# Patient Record
Sex: FEMALE | Race: WHITE | Employment: OTHER | URBAN - NONMETROPOLITAN AREA
[De-identification: names, ages, dates, MRNs, and addresses within clinical notes are randomized per-mention and may not be internally consistent; named-entity substitution may affect disease eponyms.]

---

## 2017-01-30 ENCOUNTER — TELEPHONE (OUTPATIENT)
Dept: CARDIOLOGY | Age: 67
End: 2017-01-30

## 2017-02-06 ENCOUNTER — HOSPITAL ENCOUNTER (OUTPATIENT)
Dept: PREADMISSION TESTING | Age: 67
Discharge: HOME | End: 2017-02-06
Payer: MEDICARE

## 2017-02-06 ENCOUNTER — HOSPITAL ENCOUNTER (OUTPATIENT)
Dept: GENERAL RADIOLOGY | Age: 67
Discharge: HOME | End: 2017-02-06
Payer: MEDICARE

## 2017-02-06 VITALS — BODY MASS INDEX: 47.09 KG/M2 | HEIGHT: 66 IN | WEIGHT: 293 LBS

## 2017-02-06 LAB
ABO/RH: NORMAL
ANION GAP SERPL CALCULATED.3IONS-SCNC: 15 MMOL/L (ref 7–19)
ANTIBODY SCREEN: NORMAL
APTT: 28.1 SEC (ref 26–36.2)
BASOPHILS ABSOLUTE: 0 K/UL (ref 0–0.2)
BASOPHILS RELATIVE PERCENT: 0.5 % (ref 0–1)
BILIRUBIN URINE: NEGATIVE
BLOOD, URINE: NEGATIVE
BUN BLDV-MCNC: 12 MG/DL (ref 8–23)
CALCIUM SERPL-MCNC: 9.6 MG/DL (ref 8.8–10.2)
CHLORIDE BLD-SCNC: 102 MMOL/L (ref 98–111)
CLARITY: CLEAR
CO2: 26 MMOL/L (ref 22–29)
COLOR: YELLOW
CREAT SERPL-MCNC: 0.7 MG/DL (ref 0.5–0.9)
EOSINOPHILS ABSOLUTE: 0.2 K/UL (ref 0–0.6)
EOSINOPHILS RELATIVE PERCENT: 3 % (ref 0–5)
GFR NON-AFRICAN AMERICAN: >60
GLUCOSE BLD-MCNC: 121 MG/DL (ref 74–109)
GLUCOSE URINE: NEGATIVE MG/DL
HCT VFR BLD CALC: 42.9 % (ref 37–47)
HEMOGLOBIN: 14.5 G/DL (ref 12–16)
INR BLD: 0.99 (ref 0.88–1.18)
KETONES, URINE: NEGATIVE MG/DL
LEUKOCYTE ESTERASE, URINE: NEGATIVE
LYMPHOCYTES ABSOLUTE: 2.3 K/UL (ref 1.1–4.5)
LYMPHOCYTES RELATIVE PERCENT: 36.4 % (ref 20–40)
MCH RBC QN AUTO: 30.5 PG (ref 27–31)
MCHC RBC AUTO-ENTMCNC: 33.8 G/DL (ref 33–37)
MCV RBC AUTO: 90.1 FL (ref 81–99)
MONOCYTES ABSOLUTE: 0.5 K/UL (ref 0–0.9)
MONOCYTES RELATIVE PERCENT: 7.5 % (ref 0–10)
NEUTROPHILS ABSOLUTE: 3.4 K/UL (ref 1.5–7.5)
NEUTROPHILS RELATIVE PERCENT: 52.6 % (ref 50–65)
NITRITE, URINE: NEGATIVE
PDW BLD-RTO: 12.8 % (ref 11.5–14.5)
PH UA: 6
PLATELET # BLD: 250 K/UL (ref 130–400)
PMV BLD AUTO: 10.2 FL (ref 7.4–10.4)
POTASSIUM SERPL-SCNC: 4.2 MMOL/L (ref 3.5–5)
PROTEIN UA: NEGATIVE MG/DL
PROTHROMBIN TIME: 13.1 SEC (ref 12–14.6)
RBC # BLD: 4.76 M/UL (ref 4.2–5.4)
SODIUM BLD-SCNC: 143 MMOL/L (ref 136–145)
SPECIFIC GRAVITY UA: 1.01
UROBILINOGEN, URINE: 0.2 E.U./DL
WBC # BLD: 6.4 K/UL (ref 4.8–10.8)

## 2017-02-06 PROCEDURE — 86900 BLOOD TYPING SEROLOGIC ABO: CPT

## 2017-02-06 PROCEDURE — 93005 ELECTROCARDIOGRAM TRACING: CPT

## 2017-02-06 PROCEDURE — 86901 BLOOD TYPING SEROLOGIC RH(D): CPT

## 2017-02-06 PROCEDURE — 87070 CULTURE OTHR SPECIMN AEROBIC: CPT

## 2017-02-06 PROCEDURE — 85730 THROMBOPLASTIN TIME PARTIAL: CPT

## 2017-02-06 PROCEDURE — 86850 RBC ANTIBODY SCREEN: CPT

## 2017-02-06 PROCEDURE — 85610 PROTHROMBIN TIME: CPT

## 2017-02-06 PROCEDURE — 71020 XR CHEST STANDARD TWO VW: CPT

## 2017-02-06 PROCEDURE — 85025 COMPLETE CBC W/AUTO DIFF WBC: CPT

## 2017-02-06 PROCEDURE — 81003 URINALYSIS AUTO W/O SCOPE: CPT

## 2017-02-06 PROCEDURE — 80048 BASIC METABOLIC PNL TOTAL CA: CPT

## 2017-02-06 RX ORDER — ACETAMINOPHEN,DIPHENHYDRAMINE HCL 500; 25 MG/1; MG/1
2 TABLET, FILM COATED ORAL NIGHTLY PRN
COMMUNITY
End: 2022-03-16

## 2017-02-06 RX ORDER — OXYCODONE AND ACETAMINOPHEN 7.5; 325 MG/1; MG/1
1 TABLET ORAL EVERY 4 HOURS PRN
COMMUNITY
End: 2017-07-24 | Stop reason: ALTCHOICE

## 2017-02-06 RX ORDER — PHENOL 1.4 %
AEROSOL, SPRAY (ML) MUCOUS MEMBRANE DAILY
COMMUNITY

## 2017-02-07 ENCOUNTER — ANESTHESIA EVENT (OUTPATIENT)
Dept: OPERATING ROOM | Age: 67
End: 2017-02-07
Payer: MEDICARE

## 2017-02-07 ENCOUNTER — APPOINTMENT (OUTPATIENT)
Dept: GENERAL RADIOLOGY | Age: 67
End: 2017-02-07
Attending: ORTHOPAEDIC SURGERY
Payer: MEDICARE

## 2017-02-07 ENCOUNTER — ANESTHESIA (OUTPATIENT)
Dept: OPERATING ROOM | Age: 67
End: 2017-02-07
Payer: MEDICARE

## 2017-02-07 ENCOUNTER — HOSPITAL ENCOUNTER (OUTPATIENT)
Age: 67
Setting detail: OBSERVATION
Discharge: HOME | End: 2017-02-07
Attending: ORTHOPAEDIC SURGERY | Admitting: ORTHOPAEDIC SURGERY
Payer: MEDICARE

## 2017-02-07 VITALS — DIASTOLIC BLOOD PRESSURE: 53 MMHG | OXYGEN SATURATION: 97 % | SYSTOLIC BLOOD PRESSURE: 121 MMHG

## 2017-02-07 VITALS
TEMPERATURE: 97.2 F | OXYGEN SATURATION: 93 % | RESPIRATION RATE: 16 BRPM | SYSTOLIC BLOOD PRESSURE: 131 MMHG | HEIGHT: 66 IN | DIASTOLIC BLOOD PRESSURE: 78 MMHG | HEART RATE: 70 BPM | BODY MASS INDEX: 47.09 KG/M2 | WEIGHT: 293 LBS

## 2017-02-07 DIAGNOSIS — M17.12 OSTEOARTHROSIS, LOCALIZED, PRIMARY, KNEE, LEFT: Primary | ICD-10-CM

## 2017-02-07 LAB
EKG P AXIS: 44 DEGREES
EKG P-R INTERVAL: 174 MS
EKG Q-T INTERVAL: 420 MS
EKG QRS DURATION: 132 MS
EKG QTC CALCULATION (BAZETT): 458 MS
EKG T AXIS: 41 DEGREES
MRSA CULTURE ONLY: NORMAL

## 2017-02-07 PROCEDURE — 7100000001 HC PACU RECOVERY - ADDTL 15 MIN: Performed by: ORTHOPAEDIC SURGERY

## 2017-02-07 PROCEDURE — 7100000010 HC PHASE II RECOVERY - FIRST 15 MIN: Performed by: ORTHOPAEDIC SURGERY

## 2017-02-07 PROCEDURE — G8980 MOBILITY D/C STATUS: HCPCS

## 2017-02-07 PROCEDURE — 64520 N BLOCK LUMBAR/THORACIC: CPT | Performed by: NURSE ANESTHETIST, CERTIFIED REGISTERED

## 2017-02-07 PROCEDURE — 2720000001 HC MISC SURG SUPPLY STERILE $51-500: Performed by: ORTHOPAEDIC SURGERY

## 2017-02-07 PROCEDURE — G0378 HOSPITAL OBSERVATION PER HR: HCPCS

## 2017-02-07 PROCEDURE — 6360000002 HC RX W HCPCS: Performed by: ORTHOPAEDIC SURGERY

## 2017-02-07 PROCEDURE — 2580000003 HC RX 258: Performed by: ORTHOPAEDIC SURGERY

## 2017-02-07 PROCEDURE — 3700000000 HC ANESTHESIA ATTENDED CARE: Performed by: ORTHOPAEDIC SURGERY

## 2017-02-07 PROCEDURE — 3209999900 FLUORO FOR SURGICAL PROCEDURES

## 2017-02-07 PROCEDURE — 6360000002 HC RX W HCPCS: Performed by: NURSE ANESTHETIST, CERTIFIED REGISTERED

## 2017-02-07 PROCEDURE — 97162 PT EVAL MOD COMPLEX 30 MIN: CPT

## 2017-02-07 PROCEDURE — 6370000000 HC RX 637 (ALT 250 FOR IP): Performed by: ORTHOPAEDIC SURGERY

## 2017-02-07 PROCEDURE — G8979 MOBILITY GOAL STATUS: HCPCS

## 2017-02-07 PROCEDURE — 7100000000 HC PACU RECOVERY - FIRST 15 MIN: Performed by: ORTHOPAEDIC SURGERY

## 2017-02-07 PROCEDURE — C1776 JOINT DEVICE (IMPLANTABLE): HCPCS | Performed by: ORTHOPAEDIC SURGERY

## 2017-02-07 PROCEDURE — 3600000015 HC SURGERY LEVEL 5 ADDTL 15MIN: Performed by: ORTHOPAEDIC SURGERY

## 2017-02-07 PROCEDURE — 2500000003 HC RX 250 WO HCPCS: Performed by: ORTHOPAEDIC SURGERY

## 2017-02-07 PROCEDURE — 2500000003 HC RX 250 WO HCPCS: Performed by: NURSE ANESTHETIST, CERTIFIED REGISTERED

## 2017-02-07 PROCEDURE — 73560 X-RAY EXAM OF KNEE 1 OR 2: CPT

## 2017-02-07 PROCEDURE — 2720000003 HC MISC SUTURE/STAPLES/RELOADS/ETC: Performed by: ORTHOPAEDIC SURGERY

## 2017-02-07 PROCEDURE — 3700000001 HC ADD 15 MINUTES (ANESTHESIA): Performed by: ORTHOPAEDIC SURGERY

## 2017-02-07 PROCEDURE — C1713 ANCHOR/SCREW BN/BN,TIS/BN: HCPCS | Performed by: ORTHOPAEDIC SURGERY

## 2017-02-07 PROCEDURE — 7100000011 HC PHASE II RECOVERY - ADDTL 15 MIN: Performed by: ORTHOPAEDIC SURGERY

## 2017-02-07 PROCEDURE — 36415 COLL VENOUS BLD VENIPUNCTURE: CPT

## 2017-02-07 PROCEDURE — G8978 MOBILITY CURRENT STATUS: HCPCS

## 2017-02-07 PROCEDURE — 3600000005 HC SURGERY LEVEL 5 BASE: Performed by: ORTHOPAEDIC SURGERY

## 2017-02-07 DEVICE — DISCONTINUED USE 416978 CEMENT PALACOS R SING DOSE 40GR: Type: IMPLANTABLE DEVICE | Site: KNEE | Status: FUNCTIONAL

## 2017-02-07 DEVICE — COMPONENT FEM M PART KNEE TWIN PEGGED UNI OXFORD: Type: IMPLANTABLE DEVICE | Site: FEMUR | Status: FUNCTIONAL

## 2017-02-07 RX ORDER — MORPHINE SULFATE 4 MG/ML
2 INJECTION, SOLUTION INTRAMUSCULAR; INTRAVENOUS EVERY 5 MIN PRN
Status: DISCONTINUED | OUTPATIENT
Start: 2017-02-07 | End: 2017-02-07 | Stop reason: HOSPADM

## 2017-02-07 RX ORDER — AMITRIPTYLINE HYDROCHLORIDE 25 MG/1
25 TABLET, FILM COATED ORAL NIGHTLY
Status: CANCELLED | OUTPATIENT
Start: 2017-02-07

## 2017-02-07 RX ORDER — APREPITANT 40 MG/1
40 CAPSULE ORAL ONCE
Status: DISCONTINUED | OUTPATIENT
Start: 2017-02-07 | End: 2017-02-07 | Stop reason: HOSPADM

## 2017-02-07 RX ORDER — DIAZEPAM 5 MG/1
5 TABLET ORAL EVERY 6 HOURS PRN
Status: CANCELLED | OUTPATIENT
Start: 2017-02-07

## 2017-02-07 RX ORDER — LABETALOL HYDROCHLORIDE 5 MG/ML
5 INJECTION, SOLUTION INTRAVENOUS EVERY 10 MIN PRN
Status: DISCONTINUED | OUTPATIENT
Start: 2017-02-07 | End: 2017-02-07 | Stop reason: HOSPADM

## 2017-02-07 RX ORDER — SIMVASTATIN 5 MG
5 TABLET ORAL NIGHTLY
Status: CANCELLED | OUTPATIENT
Start: 2017-02-07

## 2017-02-07 RX ORDER — 0.9 % SODIUM CHLORIDE 0.9 %
500 INTRAVENOUS SOLUTION INTRAVENOUS PRN
Status: CANCELLED | OUTPATIENT
Start: 2017-02-07

## 2017-02-07 RX ORDER — TRAMADOL HYDROCHLORIDE 50 MG/1
50 TABLET ORAL EVERY 6 HOURS PRN
Status: CANCELLED | OUTPATIENT
Start: 2017-02-07

## 2017-02-07 RX ORDER — OXYCODONE HCL 10 MG/1
10 TABLET, FILM COATED, EXTENDED RELEASE ORAL ONCE
Status: COMPLETED | OUTPATIENT
Start: 2017-02-07 | End: 2017-02-07

## 2017-02-07 RX ORDER — SODIUM CHLORIDE 0.9 % (FLUSH) 0.9 %
10 SYRINGE (ML) INJECTION PRN
Status: CANCELLED | OUTPATIENT
Start: 2017-02-07

## 2017-02-07 RX ORDER — DOCUSATE SODIUM 100 MG/1
100 CAPSULE, LIQUID FILLED ORAL DAILY
Qty: 20 CAPSULE | Refills: 0 | Status: SHIPPED | OUTPATIENT
Start: 2017-02-07 | End: 2017-07-24 | Stop reason: ALTCHOICE

## 2017-02-07 RX ORDER — PROMETHAZINE HYDROCHLORIDE 25 MG/ML
6.25 INJECTION, SOLUTION INTRAMUSCULAR; INTRAVENOUS
Status: DISCONTINUED | OUTPATIENT
Start: 2017-02-07 | End: 2017-02-07 | Stop reason: HOSPADM

## 2017-02-07 RX ORDER — BUPIVACAINE HYDROCHLORIDE 7.5 MG/ML
INJECTION, SOLUTION INTRASPINAL PRN
Status: DISCONTINUED | OUTPATIENT
Start: 2017-02-07 | End: 2017-02-07 | Stop reason: SDUPTHER

## 2017-02-07 RX ORDER — MORPHINE SULFATE 4 MG/ML
2 INJECTION, SOLUTION INTRAMUSCULAR; INTRAVENOUS
Status: CANCELLED | OUTPATIENT
Start: 2017-02-07

## 2017-02-07 RX ORDER — TRANEXAMIC ACID 650 1/1
1950 TABLET ORAL ONCE
Status: COMPLETED | OUTPATIENT
Start: 2017-02-07 | End: 2017-02-07

## 2017-02-07 RX ORDER — SODIUM CHLORIDE 0.9 % (FLUSH) 0.9 %
10 SYRINGE (ML) INJECTION EVERY 12 HOURS SCHEDULED
Status: CANCELLED | OUTPATIENT
Start: 2017-02-07

## 2017-02-07 RX ORDER — DIPHENHYDRAMINE HYDROCHLORIDE 50 MG/ML
12.5 INJECTION INTRAMUSCULAR; INTRAVENOUS
Status: DISCONTINUED | OUTPATIENT
Start: 2017-02-07 | End: 2017-02-07 | Stop reason: HOSPADM

## 2017-02-07 RX ORDER — METOCLOPRAMIDE HYDROCHLORIDE 5 MG/ML
10 INJECTION INTRAMUSCULAR; INTRAVENOUS
Status: DISCONTINUED | OUTPATIENT
Start: 2017-02-07 | End: 2017-02-07 | Stop reason: HOSPADM

## 2017-02-07 RX ORDER — SODIUM CHLORIDE, SODIUM LACTATE, POTASSIUM CHLORIDE, CALCIUM CHLORIDE 600; 310; 30; 20 MG/100ML; MG/100ML; MG/100ML; MG/100ML
INJECTION, SOLUTION INTRAVENOUS ONCE
Status: COMPLETED | OUTPATIENT
Start: 2017-02-07 | End: 2017-02-07

## 2017-02-07 RX ORDER — DIPHENHYDRAMINE HCL 25 MG
25 CAPSULE ORAL EVERY 6 HOURS PRN
Status: CANCELLED | OUTPATIENT
Start: 2017-02-07

## 2017-02-07 RX ORDER — OXYCODONE HYDROCHLORIDE 5 MG/1
10 TABLET ORAL ONCE
Status: COMPLETED | OUTPATIENT
Start: 2017-02-07 | End: 2017-02-07

## 2017-02-07 RX ORDER — POTASSIUM CHLORIDE 20 MEQ/1
20 TABLET, EXTENDED RELEASE ORAL 2 TIMES DAILY
Status: CANCELLED | OUTPATIENT
Start: 2017-02-07

## 2017-02-07 RX ORDER — ACETAMINOPHEN 500 MG
1000 TABLET ORAL ONCE
Status: COMPLETED | OUTPATIENT
Start: 2017-02-07 | End: 2017-02-07

## 2017-02-07 RX ORDER — DEXAMETHASONE SODIUM PHOSPHATE 10 MG/ML
INJECTION INTRAMUSCULAR; INTRAVENOUS PRN
Status: DISCONTINUED | OUTPATIENT
Start: 2017-02-07 | End: 2017-02-07 | Stop reason: SDUPTHER

## 2017-02-07 RX ORDER — EPHEDRINE SULFATE 50 MG/ML
INJECTION, SOLUTION INTRAVENOUS PRN
Status: DISCONTINUED | OUTPATIENT
Start: 2017-02-07 | End: 2017-02-07 | Stop reason: SDUPTHER

## 2017-02-07 RX ORDER — ASPIRIN 81 MG/1
81 TABLET ORAL 2 TIMES DAILY
Status: CANCELLED | OUTPATIENT
Start: 2017-02-07

## 2017-02-07 RX ORDER — GABAPENTIN 300 MG/1
300 CAPSULE ORAL 2 TIMES DAILY
Status: CANCELLED | OUTPATIENT
Start: 2017-02-07

## 2017-02-07 RX ORDER — OXYCODONE HYDROCHLORIDE 5 MG/1
10 TABLET ORAL EVERY 4 HOURS PRN
Status: CANCELLED | OUTPATIENT
Start: 2017-02-07

## 2017-02-07 RX ORDER — MEPERIDINE HYDROCHLORIDE 25 MG/ML
12.5 INJECTION INTRAMUSCULAR; INTRAVENOUS; SUBCUTANEOUS EVERY 5 MIN PRN
Status: DISCONTINUED | OUTPATIENT
Start: 2017-02-07 | End: 2017-02-07 | Stop reason: HOSPADM

## 2017-02-07 RX ORDER — OXYCODONE HYDROCHLORIDE 10 MG/1
TABLET ORAL
Qty: 40 TABLET | Refills: 0 | Status: SHIPPED | OUTPATIENT
Start: 2017-02-07 | End: 2017-07-24 | Stop reason: ALTCHOICE

## 2017-02-07 RX ORDER — ASPIRIN 81 MG/1
81 TABLET ORAL 2 TIMES DAILY
Qty: 60 TABLET | Refills: 0 | Status: SHIPPED | OUTPATIENT
Start: 2017-02-07 | End: 2017-07-24 | Stop reason: CLARIF

## 2017-02-07 RX ORDER — PROPOFOL 10 MG/ML
INJECTION, EMULSION INTRAVENOUS CONTINUOUS PRN
Status: DISCONTINUED | OUTPATIENT
Start: 2017-02-07 | End: 2017-02-07 | Stop reason: SDUPTHER

## 2017-02-07 RX ORDER — DIAZEPAM 5 MG/1
5 TABLET ORAL EVERY 6 HOURS PRN
Qty: 40 TABLET | Refills: 0 | Status: SHIPPED | OUTPATIENT
Start: 2017-02-07 | End: 2017-02-17

## 2017-02-07 RX ORDER — ONDANSETRON 2 MG/ML
INJECTION INTRAMUSCULAR; INTRAVENOUS PRN
Status: DISCONTINUED | OUTPATIENT
Start: 2017-02-07 | End: 2017-02-07 | Stop reason: SDUPTHER

## 2017-02-07 RX ORDER — MORPHINE SULFATE 4 MG/ML
4 INJECTION, SOLUTION INTRAMUSCULAR; INTRAVENOUS EVERY 5 MIN PRN
Status: DISCONTINUED | OUTPATIENT
Start: 2017-02-07 | End: 2017-02-07 | Stop reason: HOSPADM

## 2017-02-07 RX ORDER — BUMETANIDE 1 MG/1
1 TABLET ORAL EVERY MORNING
Status: CANCELLED | OUTPATIENT
Start: 2017-02-07

## 2017-02-07 RX ORDER — CYCLOBENZAPRINE HCL 10 MG
10 TABLET ORAL NIGHTLY
Status: CANCELLED | OUTPATIENT
Start: 2017-02-07

## 2017-02-07 RX ORDER — HYDRALAZINE HYDROCHLORIDE 20 MG/ML
5 INJECTION INTRAMUSCULAR; INTRAVENOUS EVERY 10 MIN PRN
Status: DISCONTINUED | OUTPATIENT
Start: 2017-02-07 | End: 2017-02-07 | Stop reason: HOSPADM

## 2017-02-07 RX ORDER — SODIUM CHLORIDE 9 MG/ML
INJECTION, SOLUTION INTRAVENOUS CONTINUOUS
Status: CANCELLED | OUTPATIENT
Start: 2017-02-07

## 2017-02-07 RX ORDER — SCOLOPAMINE TRANSDERMAL SYSTEM 1 MG/1
1 PATCH, EXTENDED RELEASE TRANSDERMAL ONCE
Status: DISCONTINUED | OUTPATIENT
Start: 2017-02-07 | End: 2017-02-07 | Stop reason: HOSPADM

## 2017-02-07 RX ORDER — OXYCODONE HYDROCHLORIDE 5 MG/1
20 TABLET ORAL EVERY 4 HOURS PRN
Status: CANCELLED | OUTPATIENT
Start: 2017-02-07

## 2017-02-07 RX ORDER — MECLIZINE HCL 12.5 MG/1
12.5 TABLET ORAL 3 TIMES DAILY PRN
Status: CANCELLED | OUTPATIENT
Start: 2017-02-07

## 2017-02-07 RX ORDER — MORPHINE SULFATE 4 MG/ML
4 INJECTION, SOLUTION INTRAMUSCULAR; INTRAVENOUS
Status: CANCELLED | OUTPATIENT
Start: 2017-02-07

## 2017-02-07 RX ORDER — DIAZEPAM 2 MG/1
2 TABLET ORAL EVERY 6 HOURS PRN
Status: CANCELLED | OUTPATIENT
Start: 2017-02-07

## 2017-02-07 RX ORDER — PROMETHAZINE HYDROCHLORIDE 25 MG/ML
6.25 INJECTION, SOLUTION INTRAMUSCULAR; INTRAVENOUS EVERY 6 HOURS PRN
Status: CANCELLED | OUTPATIENT
Start: 2017-02-07

## 2017-02-07 RX ORDER — ONDANSETRON 2 MG/ML
4 INJECTION INTRAMUSCULAR; INTRAVENOUS EVERY 6 HOURS PRN
Status: CANCELLED | OUTPATIENT
Start: 2017-02-07

## 2017-02-07 RX ORDER — DOCUSATE SODIUM 100 MG/1
100 CAPSULE, LIQUID FILLED ORAL 2 TIMES DAILY
Status: CANCELLED | OUTPATIENT
Start: 2017-02-07

## 2017-02-07 RX ORDER — ACETAMINOPHEN 325 MG/1
650 TABLET ORAL EVERY 4 HOURS PRN
Status: CANCELLED | OUTPATIENT
Start: 2017-02-07

## 2017-02-07 RX ADMIN — ONDANSETRON HYDROCHLORIDE 4 MG: 2 INJECTION, SOLUTION INTRAVENOUS at 10:00

## 2017-02-07 RX ADMIN — DEXAMETHASONE SODIUM PHOSPHATE 10 MG: 10 INJECTION INTRAMUSCULAR; INTRAVENOUS at 10:19

## 2017-02-07 RX ADMIN — BUPIVACAINE HYDROCHLORIDE IN DEXTROSE 2 ML: 7.5 INJECTION, SOLUTION SUBARACHNOID at 10:16

## 2017-02-07 RX ADMIN — TRANEXAMIC ACID 1950 MG: 650 TABLET ORAL at 09:10

## 2017-02-07 RX ADMIN — SODIUM CHLORIDE, POTASSIUM CHLORIDE, SODIUM LACTATE AND CALCIUM CHLORIDE: 600; 310; 30; 20 INJECTION, SOLUTION INTRAVENOUS at 09:10

## 2017-02-07 RX ADMIN — Medication 2 G: at 10:20

## 2017-02-07 RX ADMIN — ACETAMINOPHEN 1000 MG: 500 TABLET ORAL at 09:09

## 2017-02-07 RX ADMIN — OXYCODONE HYDROCHLORIDE 10 MG: 10 TABLET, FILM COATED, EXTENDED RELEASE ORAL at 09:09

## 2017-02-07 RX ADMIN — EPHEDRINE SULFATE 5 MG: 50 INJECTION, SOLUTION INTRAMUSCULAR; INTRAVENOUS; SUBCUTANEOUS at 10:49

## 2017-02-07 RX ADMIN — OXYCODONE HYDROCHLORIDE 10 MG: 5 TABLET ORAL at 15:26

## 2017-02-07 RX ADMIN — PROPOFOL 75 MCG/KG/MIN: 10 INJECTION, EMULSION INTRAVENOUS at 10:17

## 2017-02-07 ASSESSMENT — PAIN SCALES - GENERAL
PAINLEVEL_OUTOF10: 6
PAINLEVEL_OUTOF10: 2
PAINLEVEL_OUTOF10: 6

## 2017-02-07 ASSESSMENT — PAIN DESCRIPTION - PAIN TYPE
TYPE: SURGICAL PAIN
TYPE: SURGICAL PAIN

## 2017-02-07 ASSESSMENT — PAIN DESCRIPTION - LOCATION
LOCATION: KNEE
LOCATION: KNEE

## 2017-02-07 ASSESSMENT — PAIN DESCRIPTION - ORIENTATION
ORIENTATION: LEFT
ORIENTATION: LEFT

## 2017-07-24 ENCOUNTER — OFFICE VISIT (OUTPATIENT)
Dept: CARDIOLOGY | Age: 67
End: 2017-07-24
Payer: MEDICARE

## 2017-07-24 VITALS
SYSTOLIC BLOOD PRESSURE: 128 MMHG | WEIGHT: 272 LBS | HEIGHT: 66 IN | BODY MASS INDEX: 43.71 KG/M2 | DIASTOLIC BLOOD PRESSURE: 84 MMHG | HEART RATE: 75 BPM

## 2017-07-24 DIAGNOSIS — I15.9 SECONDARY HYPERTENSION: Primary | ICD-10-CM

## 2017-07-24 PROCEDURE — 93000 ELECTROCARDIOGRAM COMPLETE: CPT | Performed by: NURSE PRACTITIONER

## 2017-07-24 PROCEDURE — 99213 OFFICE O/P EST LOW 20 MIN: CPT | Performed by: NURSE PRACTITIONER

## 2017-07-24 PROCEDURE — 1036F TOBACCO NON-USER: CPT | Performed by: NURSE PRACTITIONER

## 2017-07-24 PROCEDURE — G8400 PT W/DXA NO RESULTS DOC: HCPCS | Performed by: NURSE PRACTITIONER

## 2017-07-24 PROCEDURE — 1090F PRES/ABSN URINE INCON ASSESS: CPT | Performed by: NURSE PRACTITIONER

## 2017-07-24 PROCEDURE — 3017F COLORECTAL CA SCREEN DOC REV: CPT | Performed by: NURSE PRACTITIONER

## 2017-07-24 PROCEDURE — 1123F ACP DISCUSS/DSCN MKR DOCD: CPT | Performed by: NURSE PRACTITIONER

## 2017-07-24 PROCEDURE — G8427 DOCREV CUR MEDS BY ELIG CLIN: HCPCS | Performed by: NURSE PRACTITIONER

## 2017-07-24 PROCEDURE — G8417 CALC BMI ABV UP PARAM F/U: HCPCS | Performed by: NURSE PRACTITIONER

## 2017-07-24 PROCEDURE — 4040F PNEUMOC VAC/ADMIN/RCVD: CPT | Performed by: NURSE PRACTITIONER

## 2017-07-24 PROCEDURE — 3014F SCREEN MAMMO DOC REV: CPT | Performed by: NURSE PRACTITIONER

## 2022-03-16 ENCOUNTER — OFFICE VISIT (OUTPATIENT)
Dept: NEUROLOGY | Age: 72
End: 2022-03-16
Payer: MEDICARE

## 2022-03-16 VITALS
DIASTOLIC BLOOD PRESSURE: 89 MMHG | SYSTOLIC BLOOD PRESSURE: 151 MMHG | WEIGHT: 290 LBS | HEART RATE: 90 BPM | BODY MASS INDEX: 46.61 KG/M2 | HEIGHT: 66 IN

## 2022-03-16 DIAGNOSIS — H53.149 PHOTOPHOBIA: ICD-10-CM

## 2022-03-16 DIAGNOSIS — G43.719 INTRACTABLE CHRONIC MIGRAINE WITHOUT AURA AND WITHOUT STATUS MIGRAINOSUS: Primary | ICD-10-CM

## 2022-03-16 DIAGNOSIS — R42 VERTIGO: ICD-10-CM

## 2022-03-16 DIAGNOSIS — H81.09 MENIERE'S DISEASE, UNSPECIFIED LATERALITY: ICD-10-CM

## 2022-03-16 PROCEDURE — G8400 PT W/DXA NO RESULTS DOC: HCPCS | Performed by: PSYCHIATRY & NEUROLOGY

## 2022-03-16 PROCEDURE — 1123F ACP DISCUSS/DSCN MKR DOCD: CPT | Performed by: PSYCHIATRY & NEUROLOGY

## 2022-03-16 PROCEDURE — 1036F TOBACCO NON-USER: CPT | Performed by: PSYCHIATRY & NEUROLOGY

## 2022-03-16 PROCEDURE — G8484 FLU IMMUNIZE NO ADMIN: HCPCS | Performed by: PSYCHIATRY & NEUROLOGY

## 2022-03-16 PROCEDURE — 1090F PRES/ABSN URINE INCON ASSESS: CPT | Performed by: PSYCHIATRY & NEUROLOGY

## 2022-03-16 PROCEDURE — G8427 DOCREV CUR MEDS BY ELIG CLIN: HCPCS | Performed by: PSYCHIATRY & NEUROLOGY

## 2022-03-16 PROCEDURE — 3017F COLORECTAL CA SCREEN DOC REV: CPT | Performed by: PSYCHIATRY & NEUROLOGY

## 2022-03-16 PROCEDURE — G8417 CALC BMI ABV UP PARAM F/U: HCPCS | Performed by: PSYCHIATRY & NEUROLOGY

## 2022-03-16 PROCEDURE — 4040F PNEUMOC VAC/ADMIN/RCVD: CPT | Performed by: PSYCHIATRY & NEUROLOGY

## 2022-03-16 PROCEDURE — 99204 OFFICE O/P NEW MOD 45 MIN: CPT | Performed by: PSYCHIATRY & NEUROLOGY

## 2022-03-16 RX ORDER — SUMATRIPTAN 100 MG/1
100 TABLET, FILM COATED ORAL
Qty: 9 TABLET | Refills: 5 | Status: SHIPPED | OUTPATIENT
Start: 2022-03-16 | End: 2022-03-16

## 2022-03-16 RX ORDER — TIZANIDINE 4 MG/1
4 TABLET ORAL EVERY 6 HOURS PRN
COMMUNITY

## 2022-03-16 RX ORDER — FUROSEMIDE 40 MG/1
TABLET ORAL
COMMUNITY
Start: 2022-01-31

## 2022-03-16 RX ORDER — FROVATRIPTAN SUCCINATE 2.5 MG/1
2.5 TABLET, FILM COATED ORAL PRN
Qty: 9 TABLET | Refills: 5 | Status: SHIPPED | OUTPATIENT
Start: 2022-03-16

## 2022-03-16 RX ORDER — RIZATRIPTAN BENZOATE 10 MG/1
10 TABLET ORAL
Qty: 9 TABLET | Refills: 5 | Status: SHIPPED | OUTPATIENT
Start: 2022-03-16 | End: 2022-03-16

## 2022-03-16 RX ORDER — ZOLMITRIPTAN 5 MG/1
5 TABLET, FILM COATED ORAL
Qty: 9 TABLET | Refills: 5 | Status: SHIPPED | OUTPATIENT
Start: 2022-03-16 | End: 2022-03-16

## 2022-03-16 RX ORDER — BUTALBITAL, ACETAMINOPHEN AND CAFFEINE 50; 325; 40 MG/1; MG/1; MG/1
1 TABLET ORAL EVERY 6 HOURS PRN
Qty: 10 TABLET | Refills: 0 | Status: SHIPPED | OUTPATIENT
Start: 2022-03-16

## 2022-03-16 RX ORDER — RIMEGEPANT SULFATE 75 MG/75MG
75 TABLET, ORALLY DISINTEGRATING ORAL PRN
Qty: 30 TABLET | Refills: 5 | Status: SHIPPED | OUTPATIENT
Start: 2022-03-16

## 2022-03-16 NOTE — PROGRESS NOTES
Chief Complaint   Patient presents with    New Patient     Referred by Dr. Kelly Perales for migraines, pt states she has been having a lot of bilateral arm pain as well         Feli Perez is a 80y.o. year old female who is seen for evaluation of migraines related to her Ménière's. The patient indicates that she has had Ménière's since 2014 and is treated at [facility]. She gets dexamethasone injections into her ear. Once or twice a month she will get a bad migraines associated with her Ménière's with associated photophobia, phonophobia and nausea. There is no visual change. She does have history of cervical fusion and does have some neck pain. There is no family history of migraines. Her previous MRIs have been unremarkable according to her. States typically she will take her Valium and meclizine and go to sleep. When she wakes up a few hours later her headache has resolved. Weather can trigger her migraines. Active Ambulatory Problems     Diagnosis Date Noted    Esophagitis 05/01/2010    HTN (hypertension) 05/01/2010    Tobacco abuse 05/01/2010    Obesity     DJD (degenerative joint disease)     IBS (irritable bowel syndrome)     Glucose intolerance (impaired glucose tolerance)     Intractable chronic migraine without aura and without status migrainosus 03/16/2022    Photophobia 03/16/2022    Meniere's disease 03/16/2022    Vertigo 03/16/2022     Resolved Ambulatory Problems     Diagnosis Date Noted    No Resolved Ambulatory Problems     Past Medical History:   Diagnosis Date    Acid reflux     Bundle branch block, left     Hyperlipidemia     PONV (postoperative nausea and vomiting)        Past Surgical History:   Procedure Laterality Date    APPENDECTOMY      CARDIAC CATHETERIZATION  05/30/2010    EF 60%.  normal coronay arteries     CERVICAL SPINE SURGERY  2011    ANTERIOR C5-6 FUSION    CERVICAL SPINE SURGERY      CHOLECYSTECTOMY      COLONOSCOPY      FOOT SURGERY      Left  HYSTERECTOMY      KNEE ARTHROPLASTY Left 2/7/2017    KNEE UNICOMPARTMENTAL ARTHROPLASTY  performed by Olaf Rowley MD at [address] UPPER GASTROINTESTINAL ENDOSCOPY  06/2010    DARYA biopsy. Family History   Problem Relation Age of Onset    Atrial Fibrillation Mother     Heart Disease Father 58        FATAL MI       Allergies   Allergen Reactions    Ace Inhibitors     Flagyl [Metronidazole]     Lyrica [Pregabalin]        Social History     Socioeconomic History    Marital status:      Spouse name: Not on file    Number of children: Not on file    Years of education: Not on file    Highest education level: Not on file   Occupational History    Not on file   Tobacco Use    Smoking status: Former Smoker     Types: Cigarettes     Quit date: 2/6/2007     Years since quitting: 15.1    Smokeless tobacco: Never Used    Tobacco comment: 06/01/2010:  Quit smoking in 2007. Substance and Sexual Activity    Alcohol use: No    Drug use: No    Sexual activity: Not on file   Other Topics Concern    Not on file   Social History Narrative    Not on file     Social Determinants of Health     Financial Resource Strain:     Difficulty of Paying Living Expenses: Not on file   Food Insecurity:     Worried About Running Out of Food in the Last Year: Not on file    [place] of Food in the Last Year: Not on file   Transportation Needs:     Lack of Transportation (Medical): Not on file    Lack of Transportation (Non-Medical):  Not on file   Physical Activity:     Days of Exercise per Week: Not on file    Minutes of Exercise per Session: Not on file   Stress:     Feeling of Stress : Not on file   Social Connections:     Frequency of Communication with Friends and Family: Not on file    Frequency of Social Gatherings with Friends and Family: Not on file    Attends Anabaptist Services: Not on file    Active Member of Clubs or Organizations: Not on file  Attends Club or Organization Meetings: Not on file    Marital Status: Not on file   Intimate Partner Violence:     Fear of Current or Ex-Partner: Not on file    Emotionally Abused: Not on file    Physically Abused: Not on file    Sexually Abused: Not on file   Housing Stability:     Unable to Pay for Housing in the Last Year: Not on file    Number of [place] in the Last Year: Not on file    Unstable Housing in the Last Year: Not on file       Review of Systems     Constitutional - No fever or chills. No diaphoresis or significant fatigue. HENT -  No tinnitus or significant hearing loss. Eyes - no sudden vision change or eye pain  Respiratory - no significant shortness of breath or cough  Cardiovascular - no chest pain No palpitations or significant leg swelling  Gastrointestinal - no abdominal swelling or pain. Genitourinary - No difficulty urinating, dysuria  Musculoskeletal - no back pain or myalgia. Skin - no color change or rash  Neurologic - No seizures. No lateralizing weakness. Hematologic - no easy bruising or excessive bleeding. Psychiatric - no severe anxiety or nervousness. All other review of systems are negative.       Current Outpatient Medications   Medication Sig Dispense Refill    furosemide (LASIX) 40 MG tablet TAKE 1 TABLET BY MOUTH EVERY DAY      tiZANidine (ZANAFLEX) 4 MG tablet Take 4 mg by mouth every 6 hours as needed      SUMAtriptan (IMITREX) 100 MG tablet Take 1 tablet by mouth once as needed for Migraine (max 2 in 24 hrs) 9 tablet 5    rizatriptan (MAXALT) 10 MG tablet Take 1 tablet by mouth once as needed for Migraine (max 2 in 24 hrs) May repeat in 2 hours if needed 9 tablet 5    ZOLMitriptan (ZOMIG) 5 MG tablet Take 1 tablet by mouth once as needed for Migraine 9 tablet 5    Ubrogepant 100 MG TABS Take 100 mg by mouth as needed (max 2 in 24 hours) 10 tablet 11    Rimegepant Sulfate (NURTEC) 75 MG TBDP Take 75 mg by mouth as needed (max one in 24 hours) 30 tablet 5    butalbital-acetaminophen-caffeine (ESGIC) -40 MG per tablet Take 1 tablet by mouth every 6 hours as needed for Headaches 10 tablet 0    frovatriptan succinate (FROVA) 2.5 MG TABS Take 1 tablet by mouth as needed for Migraine (max 2 in 24 hrs) 9 tablet 5    Multiple Vitamins-Minerals (MULTIVITAMIN ADULTS 50+) TABS Take by mouth daily Indications: SUPPLEMENT      amitriptyline (ELAVIL) 25 MG tablet Take 25 mg by mouth nightly Indications: SLEEP       diazepam (VALIUM) 2 MG tablet Take 2 mg by mouth every 6 hours as needed (MENIERES)       meclizine (ANTIVERT) 12.5 MG tablet Take 12.5 mg by mouth 3 times daily as needed Indications: MENIERE'S       potassium chloride SA (K-DUR;KLOR-CON M) 20 MEQ tablet Take 20 mEq by mouth 2 times daily Indications: REPLACEMENT OF POTASSIUM       losartan (COZAAR) 50 MG tablet Take 50 mg by mouth every morning Indications: HYPERTENSION       lovastatin (MEVACOR) 20 MG tablet Take 20 mg by mouth every evening Indications: HYPERTENSION       vitamin D (CHOLECALCIFEROL) 1000 UNIT TABS tablet Take 50,000 Units by mouth once a week Indications: SUPPLEMENT        No current facility-administered medications for this visit. BP (!) 151/89   Pulse 90   Ht 5' 6\" (1.676 m)   Wt 290 lb (131.5 kg)   BMI 46.81 kg/m²     Constitutional - well developed, well nourished. Eyes - conjunctiva normal.  Pupils not tested  Ear, nose, throat -hearing intact to finger rub No scars, masses, or lesions over external nose or ears, no atrophy of tongue  Neck-symmetric, no masses noted, no jugular vein distension  Respiration- chest wall appears symmetric, good expansion,   normal effort without use of accessory muscles  Musculoskeletal - no significant wasting of muscles noted, no bony deformities  Extremities-no clubbing, cyanosis or edema  Skin - warm, dry, and intact. No rash, erythema, or pallor.   Psychiatric - mood, affect, and behavior appear normal. Neurological exam  Awake, alert, fluent oriented x 3 appropriate affect  Attention and concentration appear appropriate  Recent and remote memory appears unremarkable  Speech normal without dysarthria  No clear issues with language of fund of knowledge    Cranial Nerve Exam   CN II- Visual fields grossly unremarkable  CN III, IV,VI-EOMI, No nystagmus, conjugate eye movements, no ptosis  CN V-sensation intact to LT over face  CN VII-no facial assymetry  CN VIII-Hearing intact to finger rub  CN IX and X- Palate not tested  CN XI-not test shoulder shrug  CN XII-Tongue midline with no fasciculations or fibrillations    Motor Exam  V/V throughout upper and lower extremities bilaterally, no cogwheeling, normal tone    Sensory Exam  Sensation intact to light touch and temperature upper and lower extremities bilaterally    Reflexes   Not tested  No clonus ankles  No Boels's sign bilateral hands    Tremors- no tremors in hands or head noted    Gait  Normal base and speed  No ataxia    Coordination  Finger to nose-unremarkable    No results found for: [id number]  Lab Results   Component Value Date    WBC 6.4 02/06/2017    HGB 14.5 02/06/2017    HCT 42.9 02/06/2017    MCV 90.1 02/06/2017     02/06/2017     Lab Results   Component Value Date     02/06/2017    K 4.2 02/06/2017     02/06/2017    CO2 26 02/06/2017    BUN 12 02/06/2017    CREATININE 0.7 02/06/2017    GLUCOSE 121 (H) 02/06/2017    CALCIUM 9.6 02/06/2017    LABGLOM >60 02/06/2017           Assessment    ICD-10-CM    1. Intractable chronic migraine without aura and without status migrainosus  G43.719 butalbital-acetaminophen-caffeine (ESGIC) -40 MG per tablet   2. Photophobia  H53.149    3. Meniere's disease, unspecified laterality  H81.09    4. Vertigo  R42        Her neurological examination today was unremarkable. Based upon her history and examination, her headaches appear to be consistent with migraines.   At this time she was given several triptans along with [place] Shackle, Nurtec, and Fioricet to see which would be the most effective for abortive treatment of migraines. She is to continue on her Elavil at this time. The patient indicated understanding of the management plan. She is to follow-up with me in approximately 3 months and call with any further problems. Plan    No orders of the defined types were placed in this encounter. Orders Placed This Encounter   Medications    SUMAtriptan (IMITREX) 100 MG tablet     Sig: Take 1 tablet by mouth once as needed for Migraine (max 2 in 24 hrs)     Dispense:  9 tablet     Refill:  5    rizatriptan (MAXALT) 10 MG tablet     Sig: Take 1 tablet by mouth once as needed for Migraine (max 2 in 24 hrs) May repeat in 2 hours if needed     Dispense:  9 tablet     Refill:  5    ZOLMitriptan (ZOMIG) 5 MG tablet     Sig: Take 1 tablet by mouth once as needed for Migraine     Dispense:  9 tablet     Refill:  5    Ubrogepant 100 MG TABS     Sig: Take 100 mg by mouth as needed (max 2 in 24 hours)     Dispense:  10 tablet     Refill:  11    Rimegepant Sulfate (NURTEC) 75 MG TBDP     Sig: Take 75 mg by mouth as needed (max one in 24 hours)     Dispense:  30 tablet     Refill:  5    butalbital-acetaminophen-caffeine (ESGIC) -40 MG per tablet     Sig: Take 1 tablet by mouth every 6 hours as needed for Headaches     Dispense:  10 tablet     Refill:  0    frovatriptan succinate (FROVA) 2.5 MG TABS     Sig: Take 1 tablet by mouth as needed for Migraine (max 2 in 24 hrs)     Dispense:  9 tablet     Refill:  5       Return in about 3 months (around 6/16/2022). EMR Dragon/transcription disclaimer:Significant part of this  encounter note is electronic transcription/translation of spoken language to printed text. The electronic translation of spoken language may be erroneous, or at times, nonsensical words or phrases may be inadvertently transcribed.  Although I have reviewed the note for such errors, some may still exist.

## 2022-07-27 ENCOUNTER — OFFICE VISIT (OUTPATIENT)
Dept: NEUROLOGY | Age: 72
End: 2022-07-27
Payer: MEDICARE

## 2022-07-27 VITALS
DIASTOLIC BLOOD PRESSURE: 88 MMHG | BODY MASS INDEX: 46.61 KG/M2 | HEIGHT: 66 IN | WEIGHT: 290 LBS | SYSTOLIC BLOOD PRESSURE: 116 MMHG | HEART RATE: 101 BPM

## 2022-07-27 DIAGNOSIS — H81.09 MENIERE'S DISEASE, UNSPECIFIED LATERALITY: ICD-10-CM

## 2022-07-27 DIAGNOSIS — R42 VERTIGO: ICD-10-CM

## 2022-07-27 DIAGNOSIS — G43.719 INTRACTABLE CHRONIC MIGRAINE WITHOUT AURA AND WITHOUT STATUS MIGRAINOSUS: Primary | ICD-10-CM

## 2022-07-27 DIAGNOSIS — H53.149 PHOTOPHOBIA: ICD-10-CM

## 2022-07-27 PROCEDURE — 1123F ACP DISCUSS/DSCN MKR DOCD: CPT | Performed by: PSYCHIATRY & NEUROLOGY

## 2022-07-27 PROCEDURE — G8400 PT W/DXA NO RESULTS DOC: HCPCS | Performed by: PSYCHIATRY & NEUROLOGY

## 2022-07-27 PROCEDURE — G8417 CALC BMI ABV UP PARAM F/U: HCPCS | Performed by: PSYCHIATRY & NEUROLOGY

## 2022-07-27 PROCEDURE — 1036F TOBACCO NON-USER: CPT | Performed by: PSYCHIATRY & NEUROLOGY

## 2022-07-27 PROCEDURE — 1090F PRES/ABSN URINE INCON ASSESS: CPT | Performed by: PSYCHIATRY & NEUROLOGY

## 2022-07-27 PROCEDURE — 99214 OFFICE O/P EST MOD 30 MIN: CPT | Performed by: PSYCHIATRY & NEUROLOGY

## 2022-07-27 PROCEDURE — G8427 DOCREV CUR MEDS BY ELIG CLIN: HCPCS | Performed by: PSYCHIATRY & NEUROLOGY

## 2022-07-27 PROCEDURE — 3017F COLORECTAL CA SCREEN DOC REV: CPT | Performed by: PSYCHIATRY & NEUROLOGY

## 2022-07-27 RX ORDER — CELECOXIB 100 MG/1
100 CAPSULE ORAL DAILY
COMMUNITY

## 2022-07-27 RX ORDER — METFORMIN HYDROCHLORIDE 750 MG/1
750 TABLET, EXTENDED RELEASE ORAL
COMMUNITY

## 2022-07-27 NOTE — PROGRESS NOTES
ARTHROPLASTY  performed by Milton Jones MD at [address] ENDOSCOPY  06/2010    DARYA biopsy. Family History   Problem Relation Age of Onset    Atrial Fibrillation Mother     Heart Disease Father 58        FATAL MI       Allergies   Allergen Reactions    Ace Inhibitors     Flagyl [Metronidazole]     Lyrica [Pregabalin]        Social History     Socioeconomic History    Marital status:      Spouse name: Not on file    Number of children: Not on file    Years of education: Not on file    Highest education level: Not on file   Occupational History    Not on file   Tobacco Use    Smoking status: Former     Types: Cigarettes     Quit date: 2/6/2007     Years since quitting: 15.4    Smokeless tobacco: Never    Tobacco comments:     06/01/2010:  Quit smoking in 2007. Substance and Sexual Activity    Alcohol use: No    Drug use: No    Sexual activity: Not on file   Other Topics Concern    Not on file   Social History Narrative    Not on file     Social Determinants of Health     Financial Resource Strain: Not on file   Food Insecurity: Not on file   Transportation Needs: Not on file   Physical Activity: Not on file   Stress: Not on file   Social Connections: Not on file   Intimate Partner Violence: Not on file   Housing Stability: Not on file       Review of Systems     Constitutional - No fever or chills. No diaphoresis or significant fatigue. HENT -  No tinnitus or significant hearing loss. Eyes - no sudden vision change or eye pain  Respiratory - no significant shortness of breath or cough  Cardiovascular - no chest pain No palpitations or significant leg swelling  Gastrointestinal - no abdominal swelling or pain. Genitourinary - No difficulty urinating, dysuria  Musculoskeletal - no back pain or myalgia. Skin - no color change or rash  Neurologic - No seizures. No lateralizing weakness.   Hematologic - no easy bruising or excessive bleeding. Psychiatric - no severe anxiety or nervousness. All other review of systems are negative. Current Outpatient Medications   Medication Sig Dispense Refill    metFORMIN (GLUCOPHAGE-XR) 750 MG extended release tablet Take 750 mg by mouth daily (with breakfast)      celecoxib (CELEBREX) 100 MG capsule Take 100 mg by mouth in the morning.       Ibandronate Sodium (BONIVA PO) Take by mouth      furosemide (LASIX) 40 MG tablet TAKE 1 TABLET BY MOUTH EVERY DAY      tiZANidine (ZANAFLEX) 4 MG tablet Take 4 mg by mouth every 6 hours as needed      Ubrogepant 100 MG TABS Take 100 mg by mouth as needed (max 2 in 24 hours) 10 tablet 11    Rimegepant Sulfate (NURTEC) 75 MG TBDP Take 75 mg by mouth as needed (max one in 24 hours) 30 tablet 5    butalbital-acetaminophen-caffeine (ESGIC) -40 MG per tablet Take 1 tablet by mouth every 6 hours as needed for Headaches 10 tablet 0    frovatriptan succinate (FROVA) 2.5 MG TABS Take 1 tablet by mouth as needed for Migraine (max 2 in 24 hrs) 9 tablet 5    Multiple Vitamins-Minerals (MULTIVITAMIN ADULTS 50+) TABS Take by mouth daily Indications: SUPPLEMENT      amitriptyline (ELAVIL) 25 MG tablet Take 25 mg by mouth nightly Indications: SLEEP       diazepam (VALIUM) 2 MG tablet Take 2 mg by mouth every 6 hours as needed (MENIERES)       meclizine (ANTIVERT) 12.5 MG tablet Take 12.5 mg by mouth 3 times daily as needed Indications: MENIERE'S       potassium chloride SA (K-DUR;KLOR-CON M) 20 MEQ tablet Take 20 mEq by mouth 2 times daily Indications: REPLACEMENT OF POTASSIUM       losartan (COZAAR) 50 MG tablet Take 50 mg by mouth every morning Indications: HYPERTENSION       lovastatin (MEVACOR) 20 MG tablet Take 20 mg by mouth every evening Indications: HYPERTENSION       vitamin D (CHOLECALCIFEROL) 1000 UNIT TABS tablet Take 50,000 Units by mouth once a week Indications: SUPPLEMENT       SUMAtriptan (IMITREX) 100 MG tablet Take 1 tablet by mouth once as needed for Migraine (max 2 in 24 hrs) 9 tablet 5    rizatriptan (MAXALT) 10 MG tablet Take 1 tablet by mouth once as needed for Migraine (max 2 in 24 hrs) May repeat in 2 hours if needed 9 tablet 5    ZOLMitriptan (ZOMIG) 5 MG tablet Take 1 tablet by mouth once as needed for Migraine 9 tablet 5     No current facility-administered medications for this visit. /88   Pulse (!) 101   Ht 5' 6\" (1.676 m)   Wt 290 lb (131.5 kg)   BMI 46.81 kg/m²     Constitutional - well developed, well nourished. Eyes - conjunctiva normal.  Ear, nose, throat - No scars, masses, or lesions over external nose or ears, no atrophy of tongue  Neck-symmetric, no masses noted, no jugular vein distension  Respiration- chest wall appears symmetric, good expansion,   normal effort without use of accessory muscles  Musculoskeletal - no significant wasting of muscles noted, no bony deformities  Extremities-no clubbing, cyanosis or edema  Skin - warm, dry, and intact. No rash, erythema, or pallor.   Psychiatric - mood, affect, and behavior appear normal.      Neurological exam  Awake, alert, fluent oriented  appropriate affect  Attention and concentration appear appropriate  Recent and remote memory appears unremarkable  Speech normal without dysarthria  No clear issues with language of fund of knowledge    Cranial Nerve Exam     CN III, IV,VI-EOMI, No nystagmus, conjugate eye movements, no ptosis  CN VII-no facial assymetry        Motor Exam  antigravity throughout upper and lower extremities bilaterally    Tremors- no tremors in hands or head noted    Gait  Normal base and speed  No ataxia      No results found for: [id number]  Lab Results   Component Value Date    WBC 6.4 02/06/2017    HGB 14.5 02/06/2017    HCT 42.9 02/06/2017    MCV 90.1 02/06/2017     02/06/2017     Lab Results   Component Value Date     02/06/2017    K 4.2 02/06/2017     02/06/2017    CO2 26 02/06/2017    BUN 12 02/06/2017    CREATININE 0.7 02/06/2017 GLUCOSE 121 (H) 02/06/2017    CALCIUM 9.6 02/06/2017    LABGLOM >60 02/06/2017           Assessment    ICD-10-CM    1. Intractable chronic migraine without aura and without status migrainosus  G43.719       2. Meniere's disease, unspecified laterality  H81.09       3. Photophobia  H53.149       4. Vertigo  R42             Her neurological examination previously was unremarkable. Based upon her history and examination, her headaches appear to be consistent with migraines. At this time she was given several triptans along with [place], Nurtec, and Fioricet to see which would be the most effective for abortive treatment of migraines. She wants to stop Elavil at this time. The patient indicated understanding of the management plan. She is to follow-up with me in approximately 3 months and call with any further problems. Plan    No orders of the defined types were placed in this encounter. No orders of the defined types were placed in this encounter. Return in about 3 months (around 10/27/2022). EMR Dragon/transcription disclaimer:Significant part of this  encounter note is electronic transcription/translation of spoken language to printed text. The electronic translation of spoken language may be erroneous, or at times, nonsensical words or phrases may be inadvertently transcribed.  Although I have reviewed the note for such errors, some may still exist.

## 2022-11-08 ENCOUNTER — OFFICE VISIT (OUTPATIENT)
Dept: NEUROLOGY | Age: 72
End: 2022-11-08
Payer: MEDICARE

## 2022-11-08 VITALS
WEIGHT: 290 LBS | HEIGHT: 66 IN | BODY MASS INDEX: 46.61 KG/M2 | SYSTOLIC BLOOD PRESSURE: 127 MMHG | HEART RATE: 76 BPM | DIASTOLIC BLOOD PRESSURE: 80 MMHG

## 2022-11-08 DIAGNOSIS — H81.09 MENIERE'S DISEASE, UNSPECIFIED LATERALITY: ICD-10-CM

## 2022-11-08 DIAGNOSIS — H53.149 PHOTOPHOBIA: ICD-10-CM

## 2022-11-08 DIAGNOSIS — R42 VERTIGO: ICD-10-CM

## 2022-11-08 DIAGNOSIS — G43.719 INTRACTABLE CHRONIC MIGRAINE WITHOUT AURA AND WITHOUT STATUS MIGRAINOSUS: Primary | ICD-10-CM

## 2022-11-08 PROCEDURE — 3078F DIAST BP <80 MM HG: CPT | Performed by: PSYCHIATRY & NEUROLOGY

## 2022-11-08 PROCEDURE — G8484 FLU IMMUNIZE NO ADMIN: HCPCS | Performed by: PSYCHIATRY & NEUROLOGY

## 2022-11-08 PROCEDURE — G8427 DOCREV CUR MEDS BY ELIG CLIN: HCPCS | Performed by: PSYCHIATRY & NEUROLOGY

## 2022-11-08 PROCEDURE — 1090F PRES/ABSN URINE INCON ASSESS: CPT | Performed by: PSYCHIATRY & NEUROLOGY

## 2022-11-08 PROCEDURE — 3074F SYST BP LT 130 MM HG: CPT | Performed by: PSYCHIATRY & NEUROLOGY

## 2022-11-08 PROCEDURE — 99213 OFFICE O/P EST LOW 20 MIN: CPT | Performed by: PSYCHIATRY & NEUROLOGY

## 2022-11-08 PROCEDURE — G8417 CALC BMI ABV UP PARAM F/U: HCPCS | Performed by: PSYCHIATRY & NEUROLOGY

## 2022-11-08 PROCEDURE — 3017F COLORECTAL CA SCREEN DOC REV: CPT | Performed by: PSYCHIATRY & NEUROLOGY

## 2022-11-08 PROCEDURE — 1036F TOBACCO NON-USER: CPT | Performed by: PSYCHIATRY & NEUROLOGY

## 2022-11-08 PROCEDURE — 1123F ACP DISCUSS/DSCN MKR DOCD: CPT | Performed by: PSYCHIATRY & NEUROLOGY

## 2022-11-08 PROCEDURE — G8400 PT W/DXA NO RESULTS DOC: HCPCS | Performed by: PSYCHIATRY & NEUROLOGY

## 2022-11-08 RX ORDER — BUTALBITAL, ACETAMINOPHEN AND CAFFEINE 50; 325; 40 MG/1; MG/1; MG/1
1 TABLET ORAL EVERY 6 HOURS PRN
Qty: 120 TABLET | Refills: 1 | Status: SHIPPED | OUTPATIENT
Start: 2022-11-08

## 2022-11-08 RX ORDER — ZOLMITRIPTAN 5 MG/1
5 TABLET, FILM COATED ORAL
Qty: 9 TABLET | Refills: 11 | Status: SHIPPED | OUTPATIENT
Start: 2022-11-08 | End: 2022-11-08

## 2022-11-08 RX ORDER — SUMATRIPTAN 100 MG/1
100 TABLET, FILM COATED ORAL
Qty: 9 TABLET | Refills: 11 | Status: SHIPPED | OUTPATIENT
Start: 2022-11-08 | End: 2022-11-08

## 2022-11-08 NOTE — PROGRESS NOTES
Chief Complaint   Patient presents with    Headache        Sushma Aj is a 71y.o. year old female who is seen for evaluation of migraines related to her Ménière's. The patient indicates that she has had Ménière's since 2014 and is treated at [facility]. She gets dexamethasone injections into her ear. Once or twice a month she will get a bad migraines associated with her Ménière's with associated photophobia, phonophobia and nausea. There is no visual change. She does have history of cervical fusion and does have some neck pain. There is no family history of migraines. Her previous MRIs have been unremarkable according to her. States typically she will take her Valium and meclizine and go to sleep. When she wakes up a few hours later her headache has resolved. Weather can trigger her migraines. Imitex, Zomig and fioricet. Unable to get off Elavil with more headache. Only 1-2 headaches. Active Ambulatory Problems     Diagnosis Date Noted    Esophagitis 05/01/2010    HTN (hypertension) 05/01/2010    Tobacco abuse 05/01/2010    Obesity     DJD (degenerative joint disease)     IBS (irritable bowel syndrome)     Glucose intolerance (impaired glucose tolerance)     Intractable chronic migraine without aura and without status migrainosus 03/16/2022    Photophobia 03/16/2022    Meniere's disease 03/16/2022    Vertigo 03/16/2022     Resolved Ambulatory Problems     Diagnosis Date Noted    No Resolved Ambulatory Problems     Past Medical History:   Diagnosis Date    Acid reflux     Bundle branch block, left     Hyperlipidemia     PONV (postoperative nausea and vomiting)        Past Surgical History:   Procedure Laterality Date    APPENDECTOMY      CARDIAC CATHETERIZATION  05/30/2010    EF 60%.  normal coronay arteries     CERVICAL SPINE SURGERY  2011    ANTERIOR C5-6 FUSION    CERVICAL SPINE SURGERY      CHOLECYSTECTOMY      COLONOSCOPY      FOOT SURGERY      Left    HYSTERECTOMY (CERVIX STATUS UNKNOWN) KNEE ARTHROPLASTY Left 2/7/2017    KNEE UNICOMPARTMENTAL ARTHROPLASTY  performed by Marquita Palomares MD at [address] ENDOSCOPY  06/2010    DARYA biopsy. Family History   Problem Relation Age of Onset    Atrial Fibrillation Mother     Heart Disease Father 58        FATAL MI       Allergies   Allergen Reactions    Ace Inhibitors     Flagyl [Metronidazole]     Lyrica [Pregabalin]        Social History     Socioeconomic History    Marital status:      Spouse name: Not on file    Number of children: Not on file    Years of education: Not on file    Highest education level: Not on file   Occupational History    Not on file   Tobacco Use    Smoking status: Former     Types: Cigarettes     Quit date: 2/6/2007     Years since quitting: 15.7    Smokeless tobacco: Never    Tobacco comments:     06/01/2010:  Quit smoking in 2007. Substance and Sexual Activity    Alcohol use: No    Drug use: No    Sexual activity: Not on file   Other Topics Concern    Not on file   Social History Narrative    Not on file     Social Determinants of Health     Financial Resource Strain: Not on file   Food Insecurity: Not on file   Transportation Needs: Not on file   Physical Activity: Not on file   Stress: Not on file   Social Connections: Not on file   Intimate Partner Violence: Not on file   Housing Stability: Not on file       Review of Systems     Constitutional - No fever or chills. No diaphoresis or significant fatigue. HENT -  No tinnitus or significant hearing loss. Eyes - no sudden vision change or eye pain  Respiratory - no significant shortness of breath or cough  Cardiovascular - no chest pain No palpitations or significant leg swelling  Gastrointestinal - no abdominal swelling or pain. Genitourinary - No difficulty urinating, dysuria  Musculoskeletal - no back pain or myalgia. Skin - no color change or rash  Neurologic - No seizures.   No lateralizing weakness. Hematologic - no easy bruising or excessive bleeding. Psychiatric - no severe anxiety or nervousness. All other review of systems are negative. Current Outpatient Medications   Medication Sig Dispense Refill    SUMAtriptan (IMITREX) 100 MG tablet Take 1 tablet by mouth once as needed for Migraine (max 2 in 24 hrs) 9 tablet 11    ZOLMitriptan (ZOMIG) 5 MG tablet Take 1 tablet by mouth once as needed for Migraine 9 tablet 11    butalbital-acetaminophen-caffeine (ESGIC) -40 MG per tablet Take 1 tablet by mouth every 6 hours as needed for Headaches 120 tablet 1    metFORMIN (GLUCOPHAGE-XR) 750 MG extended release tablet Take 750 mg by mouth daily (with breakfast)      celecoxib (CELEBREX) 100 MG capsule Take 100 mg by mouth in the morning.       Ibandronate Sodium (BONIVA PO) Take by mouth      furosemide (LASIX) 40 MG tablet TAKE 1 TABLET BY MOUTH EVERY DAY      tiZANidine (ZANAFLEX) 4 MG tablet Take 4 mg by mouth every 6 hours as needed      Multiple Vitamins-Minerals (MULTIVITAMIN ADULTS 50+) TABS Take by mouth daily Indications: SUPPLEMENT      amitriptyline (ELAVIL) 25 MG tablet Take 25 mg by mouth nightly Indications: SLEEP       diazepam (VALIUM) 2 MG tablet Take 2 mg by mouth every 6 hours as needed (MENIERES)       meclizine (ANTIVERT) 12.5 MG tablet Take 12.5 mg by mouth 3 times daily as needed Indications: MENIERE'S       potassium chloride SA (K-DUR;KLOR-CON M) 20 MEQ tablet Take 20 mEq by mouth 2 times daily Indications: REPLACEMENT OF POTASSIUM       losartan (COZAAR) 50 MG tablet Take 50 mg by mouth every morning Indications: HYPERTENSION       lovastatin (MEVACOR) 20 MG tablet Take 20 mg by mouth every evening Indications: HYPERTENSION       vitamin D (CHOLECALCIFEROL) 1000 UNIT TABS tablet Take 50,000 Units by mouth once a week Indications: SUPPLEMENT       Ubrogepant 100 MG TABS Take 100 mg by mouth as needed (max 2 in 24 hours) (Patient not taking: Reported on 11/8/2022) 10 tablet 11     No current facility-administered medications for this visit. /80   Pulse 76   Ht 5' 6\" (1.676 m)   Wt 290 lb (131.5 kg)   BMI 46.81 kg/m²     Constitutional - well developed, well nourished. Eyes - conjunctiva normal.  Ear, nose, throat - No scars, masses, or lesions over external nose or ears, no atrophy of tongue  Neck-symmetric, no masses noted, no jugular vein distension  Respiration- chest wall appears symmetric, good expansion,   normal effort without use of accessory muscles  Musculoskeletal - no significant wasting of muscles noted, no bony deformities  Extremities-no clubbing, cyanosis or edema  Skin - warm, dry, and intact. No rash, erythema, or pallor. Psychiatric - mood, affect, and behavior appear normal.      Neurological exam  Awake, alert, fluent oriented  appropriate affect  Attention and concentration appear appropriate  Recent and remote memory appears unremarkable  Speech normal without dysarthria  No clear issues with language of fund of knowledge    Cranial Nerve Exam     CN III, IV,VI-EOMI, No nystagmus, conjugate eye movements, no ptosis  CN VII-no facial assymetry        Motor Exam  antigravity throughout upper and lower extremities bilaterally    Tremors- no tremors in hands or head noted    Gait  Normal base and speed  No ataxia      No results found for: [id number]  Lab Results   Component Value Date    WBC 6.4 02/06/2017    HGB 14.5 02/06/2017    HCT 42.9 02/06/2017    MCV 90.1 02/06/2017     02/06/2017     Lab Results   Component Value Date     02/06/2017    K 4.2 02/06/2017     02/06/2017    CO2 26 02/06/2017    BUN 12 02/06/2017    CREATININE 0.7 02/06/2017    GLUCOSE 121 (H) 02/06/2017    CALCIUM 9.6 02/06/2017    LABGLOM >60 02/06/2017           Assessment    ICD-10-CM    1.  Intractable chronic migraine without aura and without status migrainosus  G43.719 butalbital-acetaminophen-caffeine (ESGIC) -40 MG per tablet      2. Meniere's disease, unspecified laterality  H81.09       3. Photophobia  H53.149       4. Vertigo  R42               Her neurological examination previously was unremarkable. Based upon her history and examination, her headaches appear to be consistent with migraines. At this time she was given several triptans along with Robertha Brittney, Nurtec, and Fioricet to see which would be the most effective for abortive treatment of migraines. She wants to stop Elavil at this time. The patient indicated understanding of the management plan. She is to follow-up with me on a PRN basis and call with any further problems. Continue with current management which is helping. Plan    No orders of the defined types were placed in this encounter. Orders Placed This Encounter   Medications    SUMAtriptan (IMITREX) 100 MG tablet     Sig: Take 1 tablet by mouth once as needed for Migraine (max 2 in 24 hrs)     Dispense:  9 tablet     Refill:  11    ZOLMitriptan (ZOMIG) 5 MG tablet     Sig: Take 1 tablet by mouth once as needed for Migraine     Dispense:  9 tablet     Refill:  11    butalbital-acetaminophen-caffeine (ESGIC) -40 MG per tablet     Sig: Take 1 tablet by mouth every 6 hours as needed for Headaches     Dispense:  120 tablet     Refill:  1         Return if symptoms worsen or fail to improve. EMR Dragon/transcription disclaimer:Significant part of this  encounter note is electronic transcription/translation of spoken language to printed text. The electronic translation of spoken language may be erroneous, or at times, nonsensical words or phrases may be inadvertently transcribed.  Although I have reviewed the note for such errors, some may still exist.

## 2023-03-22 ENCOUNTER — TELEPHONE (OUTPATIENT)
Dept: NEUROLOGY | Age: 73
End: 2023-03-22

## 2023-03-22 NOTE — TELEPHONE ENCOUNTER
[employer] has established a business relationship with the pharmaceutical  that results in a differentiated user experience on [employer] for the PA process, and may include patient support services. There may be lower cost medications available or preferred on your patients plan. HIPOLITO JOHN Key: [id number] - PA Case ID: [id number] help?  Call us at [phone]  Status  Sent to [facility]  Drug  Ubrelvy 100MG tablets  Form  [employer] Electronic PA Form (3105 NCPDP)

## 2025-01-20 ENCOUNTER — OFFICE VISIT (OUTPATIENT)
Age: 75
End: 2025-01-20
Payer: MEDICARE

## 2025-01-20 VITALS — WEIGHT: 222 LBS | BODY MASS INDEX: 35.68 KG/M2 | HEIGHT: 66 IN

## 2025-01-20 DIAGNOSIS — M25.561 RIGHT KNEE PAIN, UNSPECIFIED CHRONICITY: Primary | ICD-10-CM

## 2025-01-20 DIAGNOSIS — M17.11 PRIMARY LOCALIZED OSTEOARTHRITIS OF RIGHT KNEE: ICD-10-CM

## 2025-01-20 PROCEDURE — G8399 PT W/DXA RESULTS DOCUMENT: HCPCS | Performed by: PHYSICIAN ASSISTANT

## 2025-01-20 PROCEDURE — 1123F ACP DISCUSS/DSCN MKR DOCD: CPT | Performed by: PHYSICIAN ASSISTANT

## 2025-01-20 PROCEDURE — G8427 DOCREV CUR MEDS BY ELIG CLIN: HCPCS | Performed by: PHYSICIAN ASSISTANT

## 2025-01-20 PROCEDURE — 1036F TOBACCO NON-USER: CPT | Performed by: PHYSICIAN ASSISTANT

## 2025-01-20 PROCEDURE — 1090F PRES/ABSN URINE INCON ASSESS: CPT | Performed by: PHYSICIAN ASSISTANT

## 2025-01-20 PROCEDURE — 1159F MED LIST DOCD IN RCRD: CPT | Performed by: PHYSICIAN ASSISTANT

## 2025-01-20 PROCEDURE — 3017F COLORECTAL CA SCREEN DOC REV: CPT | Performed by: PHYSICIAN ASSISTANT

## 2025-01-20 PROCEDURE — G8417 CALC BMI ABV UP PARAM F/U: HCPCS | Performed by: PHYSICIAN ASSISTANT

## 2025-01-20 PROCEDURE — 99213 OFFICE O/P EST LOW 20 MIN: CPT | Performed by: PHYSICIAN ASSISTANT

## 2025-01-20 RX ORDER — CARVEDILOL 6.25 MG/1
TABLET ORAL
COMMUNITY
Start: 2024-11-07

## 2025-01-20 RX ORDER — TRAMADOL HYDROCHLORIDE 50 MG/1
TABLET ORAL
COMMUNITY
Start: 2025-01-04

## 2025-01-20 RX ORDER — SEMAGLUTIDE 2.68 MG/ML
INJECTION, SOLUTION SUBCUTANEOUS
COMMUNITY
Start: 2024-12-02

## 2025-01-20 RX ORDER — DAPAGLIFLOZIN 10 MG/1
10 TABLET, FILM COATED ORAL DAILY
COMMUNITY

## 2025-01-20 ASSESSMENT — PROMIS GLOBAL HEALTH SCALE
IN THE PAST 7 DAYS, HOW WOULD YOU RATE YOUR PAIN ON AVERAGE [ON A SCALE FROM 0 (NO PAIN) TO 10 (WORST IMAGINABLE PAIN)]?: 8
IN THE PAST 7 DAYS, HOW WOULD YOU RATE YOUR FATIGUE ON AVERAGE [ON A SCALE FROM 1 (NONE) TO 5 (VERY SEVERE)]?: MODERATE
IN GENERAL, WOULD YOU SAY YOUR HEALTH IS...[ON A SCALE OF 1 (POOR) TO 5 (EXCELLENT)]: GOOD
HOW IS THE PROMIS V1.1 BEING ADMINISTERED?: PAPER
IN GENERAL, PLEASE RATE HOW WELL YOU CARRY OUT YOUR USUAL SOCIAL ACTIVITIES (INCLUDES ACTIVITIES AT HOME, AT WORK, AND IN YOUR COMMUNITY, AND RESPONSIBILITIES AS A PARENT, CHILD, SPOUSE, EMPLOYEE, FRIEND, ETC) [ON A SCALE OF 1 (POOR) TO 5 (EXCELLENT)]?: VERY GOOD
IN GENERAL, HOW WOULD YOU RATE YOUR MENTAL HEALTH, INCLUDING YOUR MOOD AND YOUR ABILITY TO THINK [ON A SCALE OF 1 (POOR) TO 5 (EXCELLENT)]?: VERY GOOD
IN GENERAL, HOW WOULD YOU RATE YOUR PHYSICAL HEALTH [ON A SCALE OF 1 (POOR) TO 5 (EXCELLENT)]?: GOOD
SUM OF RESPONSES TO QUESTIONS 3, 6, 7, & 8: 19
TO WHAT EXTENT ARE YOU ABLE TO CARRY OUT YOUR EVERYDAY PHYSICAL ACTIVITIES SUCH AS WALKING, CLIMBING STAIRS, CARRYING GROCERIES, OR MOVING A CHAIR [ON A SCALE OF 1 (NOT AT ALL) TO 5 (COMPLETELY)]?: COMPLETELY
SUM OF RESPONSES TO QUESTIONS 2, 4, 5, & 10: 17
WHO IS THE PERSON COMPLETING THE PROMIS V1.1 SURVEY?: SELF
IN GENERAL, WOULD YOU SAY YOUR QUALITY OF LIFE IS...[ON A SCALE OF 1 (POOR) TO 5 (EXCELLENT)]: VERY GOOD
IN THE PAST 7 DAYS, HOW OFTEN HAVE YOU BEEN BOTHERED BY EMOTIONAL PROBLEMS, SUCH AS FEELING ANXIOUS, DEPRESSED, OR IRRITABLE [ON A SCALE FROM 1 (NEVER) TO 5 (ALWAYS)]?: NEVER
IN GENERAL, HOW WOULD YOU RATE YOUR SATISFACTION WITH YOUR SOCIAL ACTIVITIES AND RELATIONSHIPS [ON A SCALE OF 1 (POOR) TO 5 (EXCELLENT)]?: VERY GOOD

## 2025-01-20 ASSESSMENT — KOOS JR
KOOS JR TOTAL INTERVAL SCORE: 47.487
TWISING OR PIVOTING ON KNEE: MODERATE
GOING UP OR DOWN STAIRS: SEVERE
STANDING UPRIGHT: MODERATE
STRAIGHTENING KNEE FULLY: SEVERE
HOW SEVERE IS YOUR KNEE STIFFNESS AFTER FIRST WAKING IN MORNING: MODERATE
RISING FROM SITTING: MODERATE
BENDING TO THE FLOOR TO PICK UP OBJECT: MODERATE

## 2025-01-20 ASSESSMENT — ENCOUNTER SYMPTOMS: SHORTNESS OF BREATH: 0

## 2025-01-20 NOTE — PROGRESS NOTES
[facility]  [facility]  [address] [facility] [place] [address]  Dept: [phone]  Dept Fax: [phone]  Loc: [phone]     Li León (:  1950) is a 74 y.o. female,Established patient, here for evaluation of the following:    Chief Complaint   Patient presents with    Knee Pain     R knee           Subjective   Patient is a 74-year-old  female came to the clinic with right knee pain.  She has had pain for almost a whole year now.  She has been getting steroid injections in [facility] with Blade Marcum.  Her last injection was on 10/30/2024 she had a viscosupplementation injection.  She reports anything that has been done to this knee this year has not helped.  She takes Celebrex daily.  She recently started taking tramadol daily for her knee pain.  She reports even the tramadol with the Celebrex does not help her pain.  She is got pain on the inside of her knee as well as in the back of her knee.  She had a partial knee replacement with Dr. Paredes on the left knee in 2017.    Knee Pain   Pertinent negatives include no numbness.       Allergies   Allergen Reactions    Ace Inhibitors     Flagyl [Metronidazole]     Lyrica [Pregabalin]     Hydrocodone Nausea And Vomiting     Past Surgical History:   Procedure Laterality Date    APPENDECTOMY      CARDIAC CATHETERIZATION  2010    EF 60%. normal coronay arteries     CERVICAL SPINE SURGERY  2011    ANTERIOR C5-6 FUSION    CERVICAL SPINE SURGERY      CHOLECYSTECTOMY      COLONOSCOPY      FOOT SURGERY      Left    HYSTERECTOMY (CERVIX STATUS UNKNOWN)      KNEE ARTHROPLASTY Left 2017    KNEE UNICOMPARTMENTAL ARTHROPLASTY  performed by John Paredes MD at [facility] OR    TEMPOROMANDIBULAR JOINT SURGERY      UPPER GASTROINTESTINAL ENDOSCOPY  2010    DARYA biopsy.     Social History     Tobacco Use    Smoking status: Former     Current packs/day: 0.00     Types:

## 2025-02-07 ENCOUNTER — PREP FOR PROCEDURE (OUTPATIENT)
Age: 75
End: 2025-02-07

## 2025-02-07 DIAGNOSIS — Z00.00 PREVENTATIVE HEALTH CARE: ICD-10-CM

## 2025-02-07 DIAGNOSIS — M17.11 PRIMARY LOCALIZED OSTEOARTHRITIS OF RIGHT KNEE: ICD-10-CM

## 2025-02-07 DIAGNOSIS — T40.2X1A OPIOID OVERDOSE, ACCIDENTAL OR UNINTENTIONAL, INITIAL ENCOUNTER (HCC): Primary | ICD-10-CM

## 2025-02-07 PROBLEM — M25.561 RIGHT KNEE PAIN: Status: ACTIVE | Noted: 2025-02-07

## 2025-02-07 RX ORDER — MUPIROCIN 20 MG/G
OINTMENT TOPICAL
Qty: 30 G | Refills: 0 | Status: SHIPPED | OUTPATIENT
Start: 2025-02-07

## 2025-02-07 RX ORDER — CHLORHEXIDINE GLUCONATE 40 MG/ML
SOLUTION TOPICAL
Qty: 236 ML | Refills: 0 | Status: SHIPPED | OUTPATIENT
Start: 2025-02-07

## 2025-02-10 RX ORDER — ACETAMINOPHEN 325 MG/1
1000 TABLET ORAL ONCE
Status: CANCELLED | OUTPATIENT
Start: 2025-03-20 | End: 2025-03-20

## 2025-02-10 RX ORDER — OXYCODONE HCL 10 MG/1
10 TABLET, FILM COATED, EXTENDED RELEASE ORAL ONCE
Status: CANCELLED | OUTPATIENT
Start: 2025-03-20 | End: 2025-03-20

## 2025-02-10 RX ORDER — TRANEXAMIC ACID 650 MG/1
1950 TABLET ORAL ONCE
Status: CANCELLED | OUTPATIENT
Start: 2025-03-20 | End: 2025-03-20

## 2025-02-10 RX ORDER — SCOPOLAMINE 1 MG/3D
1 PATCH, EXTENDED RELEASE TRANSDERMAL ONCE
Status: CANCELLED | OUTPATIENT
Start: 2025-03-20 | End: 2025-03-20

## 2025-02-10 RX ORDER — CELECOXIB 100 MG/1
200 CAPSULE ORAL ONCE
Status: CANCELLED | OUTPATIENT
Start: 2025-03-20 | End: 2025-03-20

## 2025-03-10 ENCOUNTER — HOSPITAL ENCOUNTER (OUTPATIENT)
Dept: PREADMISSION TESTING | Age: 75
Discharge: HOME | End: 2025-03-14
Payer: MEDICARE

## 2025-03-10 VITALS — BODY MASS INDEX: 35.35 KG/M2 | WEIGHT: 219 LBS

## 2025-03-10 LAB
ANION GAP SERPL CALCULATED.3IONS-SCNC: 10 MMOL/L (ref 8–16)
APTT PPP: 31.7 SEC (ref 26–36.2)
BUN SERPL-MCNC: 14 MG/DL (ref 8–23)
CALCIUM SERPL-MCNC: 9.2 MG/DL (ref 8.8–10.2)
CHLORIDE SERPL-SCNC: 104 MMOL/L (ref 98–107)
CO2 SERPL-SCNC: 28 MMOL/L (ref 22–29)
CREAT SERPL-MCNC: 0.7 MG/DL (ref 0.5–0.9)
EKG P AXIS: 33 DEGREES
EKG P-R INTERVAL: 164 MS
EKG Q-T INTERVAL: 424 MS
EKG QRS DURATION: 134 MS
EKG QTC CALCULATION (BAZETT): 446 MS
EKG T AXIS: 42 DEGREES
ERYTHROCYTE [DISTWIDTH] IN BLOOD BY AUTOMATED COUNT: 12.5 % (ref 11.5–14.5)
GLUCOSE SERPL-MCNC: 87 MG/DL (ref 70–99)
HCT VFR BLD AUTO: 47.7 % (ref 37–47)
HGB BLD-MCNC: 15.5 G/DL (ref 12–16)
INR PPP: 1 (ref 0.88–1.18)
MCH RBC QN AUTO: 30.1 PG (ref 27–31)
MCHC RBC AUTO-ENTMCNC: 32.5 G/DL (ref 33–37)
MCV RBC AUTO: 92.6 FL (ref 81–99)
MRSA DNA SPEC QL NAA+PROBE: NOT DETECTED
PLATELET # BLD AUTO: 206 K/UL (ref 130–400)
PMV BLD AUTO: 10.2 FL (ref 9.4–12.3)
POTASSIUM SERPL-SCNC: 4.5 MMOL/L (ref 3.5–5.1)
PROTHROMBIN TIME: 12.9 SEC (ref 12–14.6)
RBC # BLD AUTO: 5.15 M/UL (ref 4.2–5.4)
SODIUM SERPL-SCNC: 142 MMOL/L (ref 136–145)
WBC # BLD AUTO: 6.4 K/UL (ref 4.8–10.8)

## 2025-03-10 PROCEDURE — 93010 ELECTROCARDIOGRAM REPORT: CPT | Performed by: INTERNAL MEDICINE

## 2025-03-10 PROCEDURE — 85730 THROMBOPLASTIN TIME PARTIAL: CPT

## 2025-03-10 PROCEDURE — 85610 PROTHROMBIN TIME: CPT

## 2025-03-10 PROCEDURE — 93005 ELECTROCARDIOGRAM TRACING: CPT | Performed by: ANESTHESIOLOGY

## 2025-03-10 PROCEDURE — 87641 MR-STAPH DNA AMP PROBE: CPT

## 2025-03-10 PROCEDURE — 80048 BASIC METABOLIC PNL TOTAL CA: CPT

## 2025-03-10 PROCEDURE — 85027 COMPLETE CBC AUTOMATED: CPT

## 2025-03-10 RX ORDER — ERGOCALCIFEROL 1.25 MG/1
50000 CAPSULE, LIQUID FILLED ORAL WEEKLY
COMMUNITY

## 2025-03-10 NOTE — DISCHARGE INSTRUCTIONS
PREOPERATIVE GUIDELINES WHEN RECEIVING ANESTHESIA    Do not eat anything after midnight the night before your surgery. You may have water up to 2 hours before your arrival time. No gum or candy the morning of surgery.  This is extremely important for your safety.    Take a bath (or shower) the night before your surgery and you may brush your teeth the morning of your surgery.    Morning of surgery no Nicotine products including smoking, vaping, patches, dip or snuff.     You will be scheduled to arrive at the hospital 2 hours before your surgery, or follow your surgeon's instructions.    Dress comfortably.  Wear loose clothing that will be easy to remove and comfortable for your trip home.    You may wear eyeglasses but bring your cases with you as they must be remove before your surgery. If you wear contacts they will have to be removed before your surgery.    Hearing aids and dentures will need to be removed before your surgery. If you wear dentures, do not glue them in the morning of surgery.     Do not wear any jewelry, including body jewelry.  All jewelry will need to be removed prior to your surgery. This includes wedding rings. Any metal touching your body can cause a burn or may have to be cut off due to swelling or injury.    Do not wear fingernail polish or make-up.    It is best not to bring any valuables with you.    If you are to stay in the hospital overnight, bring your robe, slippers and personal toiletries that you may need.    POSTOPERATIVE GUIDELINES AFTER RECEIVING ANESTHESIA    If you are to go home after your surgery, you will need a responsible adult to drive you home.     You will not be able to take public transportation after your discharge from the Operative Care Unit unless you are accompanied by a        responsible adult.    On returning home, be sure to follow your physician's orders regarding diet, activity and medications.    Remember, surgery with general anesthesia or sedation may

## 2025-03-20 ENCOUNTER — ANESTHESIA EVENT (OUTPATIENT)
Dept: OPERATING ROOM | Age: 75
End: 2025-03-20
Payer: MEDICARE

## 2025-03-20 ENCOUNTER — ANESTHESIA (OUTPATIENT)
Dept: OPERATING ROOM | Age: 75
End: 2025-03-20
Payer: MEDICARE

## 2025-03-20 ENCOUNTER — HOSPITAL ENCOUNTER (OUTPATIENT)
Age: 75
Setting detail: OUTPATIENT SURGERY
Discharge: HOME | End: 2025-03-20
Attending: ORTHOPAEDIC SURGERY | Admitting: ORTHOPAEDIC SURGERY
Payer: MEDICARE

## 2025-03-20 VITALS
WEIGHT: 219 LBS | RESPIRATION RATE: 16 BRPM | DIASTOLIC BLOOD PRESSURE: 68 MMHG | HEART RATE: 78 BPM | BODY MASS INDEX: 35.2 KG/M2 | SYSTOLIC BLOOD PRESSURE: 121 MMHG | HEIGHT: 66 IN | TEMPERATURE: 96.9 F | OXYGEN SATURATION: 100 %

## 2025-03-20 DIAGNOSIS — M17.11 PRIMARY OSTEOARTHRITIS OF RIGHT KNEE: Primary | ICD-10-CM

## 2025-03-20 LAB
GLUCOSE BLD-MCNC: 104 MG/DL (ref 70–99)
GLUCOSE BLD-MCNC: 90 MG/DL (ref 70–99)
PERFORMED ON: ABNORMAL
PERFORMED ON: NORMAL

## 2025-03-20 PROCEDURE — 7100000010 HC PHASE II RECOVERY - FIRST 15 MIN: Performed by: ORTHOPAEDIC SURGERY

## 2025-03-20 PROCEDURE — 2500000003 HC RX 250 WO HCPCS

## 2025-03-20 PROCEDURE — 6370000000 HC RX 637 (ALT 250 FOR IP): Performed by: ORTHOPAEDIC SURGERY

## 2025-03-20 PROCEDURE — 82962 GLUCOSE BLOOD TEST: CPT

## 2025-03-20 PROCEDURE — 2709999900 HC NON-CHARGEABLE SUPPLY: Performed by: ORTHOPAEDIC SURGERY

## 2025-03-20 PROCEDURE — 7100000011 HC PHASE II RECOVERY - ADDTL 15 MIN: Performed by: ORTHOPAEDIC SURGERY

## 2025-03-20 PROCEDURE — 6360000002 HC RX W HCPCS: Performed by: ANESTHESIOLOGY

## 2025-03-20 PROCEDURE — 2580000003 HC RX 258: Performed by: ANESTHESIOLOGY

## 2025-03-20 PROCEDURE — 7100000001 HC PACU RECOVERY - ADDTL 15 MIN: Performed by: ORTHOPAEDIC SURGERY

## 2025-03-20 PROCEDURE — C1713 ANCHOR/SCREW BN/BN,TIS/BN: HCPCS | Performed by: ORTHOPAEDIC SURGERY

## 2025-03-20 PROCEDURE — 2720000010 HC SURG SUPPLY STERILE: Performed by: ORTHOPAEDIC SURGERY

## 2025-03-20 PROCEDURE — 3700000000 HC ANESTHESIA ATTENDED CARE: Performed by: ORTHOPAEDIC SURGERY

## 2025-03-20 PROCEDURE — 6360000002 HC RX W HCPCS: Performed by: ORTHOPAEDIC SURGERY

## 2025-03-20 PROCEDURE — 64447 NJX AA&/STRD FEMORAL NRV IMG: CPT | Performed by: NURSE ANESTHETIST, CERTIFIED REGISTERED

## 2025-03-20 PROCEDURE — 6370000000 HC RX 637 (ALT 250 FOR IP): Performed by: ANESTHESIOLOGY

## 2025-03-20 PROCEDURE — 6360000002 HC RX W HCPCS

## 2025-03-20 PROCEDURE — 2500000003 HC RX 250 WO HCPCS: Performed by: ORTHOPAEDIC SURGERY

## 2025-03-20 PROCEDURE — 3600000015 HC SURGERY LEVEL 5 ADDTL 15MIN: Performed by: ORTHOPAEDIC SURGERY

## 2025-03-20 PROCEDURE — C1776 JOINT DEVICE (IMPLANTABLE): HCPCS | Performed by: ORTHOPAEDIC SURGERY

## 2025-03-20 PROCEDURE — 3700000001 HC ADD 15 MINUTES (ANESTHESIA): Performed by: ORTHOPAEDIC SURGERY

## 2025-03-20 PROCEDURE — 97161 PT EVAL LOW COMPLEX 20 MIN: CPT

## 2025-03-20 PROCEDURE — 2500000003 HC RX 250 WO HCPCS: Performed by: NURSE ANESTHETIST, CERTIFIED REGISTERED

## 2025-03-20 PROCEDURE — 7100000000 HC PACU RECOVERY - FIRST 15 MIN: Performed by: ORTHOPAEDIC SURGERY

## 2025-03-20 PROCEDURE — 3600000005 HC SURGERY LEVEL 5 BASE: Performed by: ORTHOPAEDIC SURGERY

## 2025-03-20 DEVICE — PSN TIB STM 5 DEG SZ D R: Type: IMPLANTABLE DEVICE | Site: KNEE | Status: FUNCTIONAL

## 2025-03-20 DEVICE — IMPL KNEE ASF FB PSN CPS 12MM VE R6-9CD: Type: IMPLANTABLE DEVICE | Site: KNEE | Status: FUNCTIONAL

## 2025-03-20 DEVICE — COMPONENT FEM SZ 8 R KNEE CO CHROM NAR POST STBL CEM: Type: IMPLANTABLE DEVICE | Site: KNEE | Status: FUNCTIONAL

## 2025-03-20 DEVICE — COMPONENT PAT DIA35MM THK9MM KNEE POLY CEM CONVENTIONAL: Type: IMPLANTABLE DEVICE | Site: KNEE | Status: FUNCTIONAL

## 2025-03-20 DEVICE — EXTENSION STEM L30MM DIA14MM KNEE TAPR CEM PERSONA: Type: IMPLANTABLE DEVICE | Site: KNEE | Status: FUNCTIONAL

## 2025-03-20 DEVICE — CEMENT BONE 40 GM W/ GENTMYCN HI VISC PALACOS R+G: Type: IMPLANTABLE DEVICE | Site: KNEE | Status: FUNCTIONAL

## 2025-03-20 RX ORDER — DEXAMETHASONE SODIUM PHOSPHATE 10 MG/ML
INJECTION, SOLUTION INTRAMUSCULAR; INTRAVENOUS
Status: DISCONTINUED | OUTPATIENT
Start: 2025-03-20 | End: 2025-03-20 | Stop reason: SDUPTHER

## 2025-03-20 RX ORDER — SODIUM CHLORIDE 0.9 % (FLUSH) 0.9 %
5-40 SYRINGE (ML) INJECTION EVERY 12 HOURS SCHEDULED
Status: DISCONTINUED | OUTPATIENT
Start: 2025-03-20 | End: 2025-03-20 | Stop reason: HOSPADM

## 2025-03-20 RX ORDER — ACETAMINOPHEN 500 MG
1000 TABLET ORAL ONCE
Status: COMPLETED | OUTPATIENT
Start: 2025-03-20 | End: 2025-03-20

## 2025-03-20 RX ORDER — SODIUM CHLORIDE 9 MG/ML
INJECTION, SOLUTION INTRAVENOUS PRN
Status: DISCONTINUED | OUTPATIENT
Start: 2025-03-20 | End: 2025-03-20 | Stop reason: HOSPADM

## 2025-03-20 RX ORDER — ROPIVACAINE HYDROCHLORIDE 2 MG/ML
INJECTION, SOLUTION EPIDURAL; INFILTRATION; PERINEURAL PRN
Status: DISCONTINUED | OUTPATIENT
Start: 2025-03-20 | End: 2025-03-20 | Stop reason: HOSPADM

## 2025-03-20 RX ORDER — DEXAMETHASONE SODIUM PHOSPHATE 10 MG/ML
10 INJECTION, SOLUTION INTRAMUSCULAR; INTRAVENOUS ONCE
Status: DISCONTINUED | OUTPATIENT
Start: 2025-03-20 | End: 2025-03-20 | Stop reason: HOSPADM

## 2025-03-20 RX ORDER — OXYCODONE HCL 10 MG/1
10 TABLET, FILM COATED, EXTENDED RELEASE ORAL ONCE
Refills: 0 | Status: COMPLETED | OUTPATIENT
Start: 2025-03-20 | End: 2025-03-20

## 2025-03-20 RX ORDER — SCOPOLAMINE 1 MG/3D
1 PATCH, EXTENDED RELEASE TRANSDERMAL
Status: DISCONTINUED | OUTPATIENT
Start: 2025-03-20 | End: 2025-03-20 | Stop reason: HOSPADM

## 2025-03-20 RX ORDER — ROPIVACAINE HYDROCHLORIDE 5 MG/ML
30 INJECTION, SOLUTION EPIDURAL; INFILTRATION; PERINEURAL ONCE
Status: DISCONTINUED | OUTPATIENT
Start: 2025-03-20 | End: 2025-03-20 | Stop reason: HOSPADM

## 2025-03-20 RX ORDER — OXYCODONE HYDROCHLORIDE 5 MG/1
5 TABLET ORAL
Status: DISCONTINUED | OUTPATIENT
Start: 2025-03-20 | End: 2025-03-20 | Stop reason: HOSPADM

## 2025-03-20 RX ORDER — PROPOFOL 10 MG/ML
INJECTION, EMULSION INTRAVENOUS
Status: DISCONTINUED | OUTPATIENT
Start: 2025-03-20 | End: 2025-03-20 | Stop reason: SDUPTHER

## 2025-03-20 RX ORDER — HYDROMORPHONE HYDROCHLORIDE 1 MG/ML
0.25 INJECTION, SOLUTION INTRAMUSCULAR; INTRAVENOUS; SUBCUTANEOUS EVERY 5 MIN PRN
Status: DISCONTINUED | OUTPATIENT
Start: 2025-03-20 | End: 2025-03-20 | Stop reason: HOSPADM

## 2025-03-20 RX ORDER — APREPITANT 40 MG/1
40 CAPSULE ORAL ONCE
Status: COMPLETED | OUTPATIENT
Start: 2025-03-20 | End: 2025-03-20

## 2025-03-20 RX ORDER — ASPIRIN 81 MG/1
81 TABLET, CHEWABLE ORAL 2 TIMES DAILY
Qty: 60 TABLET | Refills: 0 | Status: SHIPPED | OUTPATIENT
Start: 2025-03-20

## 2025-03-20 RX ORDER — ONDANSETRON 2 MG/ML
INJECTION INTRAMUSCULAR; INTRAVENOUS
Status: DISCONTINUED | OUTPATIENT
Start: 2025-03-20 | End: 2025-03-20 | Stop reason: SDUPTHER

## 2025-03-20 RX ORDER — FAMOTIDINE 20 MG/1
20 TABLET, FILM COATED ORAL ONCE
Status: COMPLETED | OUTPATIENT
Start: 2025-03-20 | End: 2025-03-20

## 2025-03-20 RX ORDER — LIDOCAINE HYDROCHLORIDE 10 MG/ML
INJECTION, SOLUTION INFILTRATION; PERINEURAL
Status: DISCONTINUED | OUTPATIENT
Start: 2025-03-20 | End: 2025-03-20 | Stop reason: SDUPTHER

## 2025-03-20 RX ORDER — SODIUM CHLORIDE 0.9 % (FLUSH) 0.9 %
5-40 SYRINGE (ML) INJECTION PRN
Status: DISCONTINUED | OUTPATIENT
Start: 2025-03-20 | End: 2025-03-20 | Stop reason: HOSPADM

## 2025-03-20 RX ORDER — NALOXONE HYDROCHLORIDE 0.4 MG/ML
INJECTION, SOLUTION INTRAMUSCULAR; INTRAVENOUS; SUBCUTANEOUS PRN
Status: DISCONTINUED | OUTPATIENT
Start: 2025-03-20 | End: 2025-03-20 | Stop reason: HOSPADM

## 2025-03-20 RX ORDER — ROPIVACAINE HYDROCHLORIDE 5 MG/ML
INJECTION, SOLUTION EPIDURAL; INFILTRATION; PERINEURAL
Status: COMPLETED | OUTPATIENT
Start: 2025-03-20 | End: 2025-03-20

## 2025-03-20 RX ORDER — SODIUM CHLORIDE, SODIUM LACTATE, POTASSIUM CHLORIDE, CALCIUM CHLORIDE 600; 310; 30; 20 MG/100ML; MG/100ML; MG/100ML; MG/100ML
INJECTION, SOLUTION INTRAVENOUS CONTINUOUS
Status: DISCONTINUED | OUTPATIENT
Start: 2025-03-20 | End: 2025-03-20 | Stop reason: HOSPADM

## 2025-03-20 RX ORDER — EPINEPHRINE 1 MG/ML
INJECTION, SOLUTION, CONCENTRATE INTRAVENOUS PRN
Status: DISCONTINUED | OUTPATIENT
Start: 2025-03-20 | End: 2025-03-20 | Stop reason: HOSPADM

## 2025-03-20 RX ORDER — INDOMETHACIN 25 MG/1
CAPSULE ORAL PRN
Status: DISCONTINUED | OUTPATIENT
Start: 2025-03-20 | End: 2025-03-20 | Stop reason: HOSPADM

## 2025-03-20 RX ORDER — HYDROMORPHONE HYDROCHLORIDE 1 MG/ML
0.5 INJECTION, SOLUTION INTRAMUSCULAR; INTRAVENOUS; SUBCUTANEOUS EVERY 5 MIN PRN
Status: DISCONTINUED | OUTPATIENT
Start: 2025-03-20 | End: 2025-03-20 | Stop reason: HOSPADM

## 2025-03-20 RX ORDER — LIDOCAINE HYDROCHLORIDE 10 MG/ML
INJECTION, SOLUTION INFILTRATION; PERINEURAL
Status: COMPLETED | OUTPATIENT
Start: 2025-03-20 | End: 2025-03-20

## 2025-03-20 RX ORDER — MIDAZOLAM HYDROCHLORIDE 2 MG/2ML
2 INJECTION, SOLUTION INTRAMUSCULAR; INTRAVENOUS
Status: COMPLETED | OUTPATIENT
Start: 2025-03-20 | End: 2025-03-20

## 2025-03-20 RX ORDER — SCOPOLAMINE 1 MG/3D
1 PATCH, EXTENDED RELEASE TRANSDERMAL ONCE
Status: DISCONTINUED | OUTPATIENT
Start: 2025-03-20 | End: 2025-03-20

## 2025-03-20 RX ORDER — CELECOXIB 200 MG/1
200 CAPSULE ORAL ONCE
Status: COMPLETED | OUTPATIENT
Start: 2025-03-20 | End: 2025-03-20

## 2025-03-20 RX ORDER — TRANEXAMIC ACID 650 MG/1
1950 TABLET ORAL ONCE
Status: DISCONTINUED | OUTPATIENT
Start: 2025-03-20 | End: 2025-03-20 | Stop reason: HOSPADM

## 2025-03-20 RX ORDER — OXYCODONE HYDROCHLORIDE 5 MG/1
5 TABLET ORAL EVERY 4 HOURS PRN
Qty: 40 TABLET | Refills: 0 | Status: SHIPPED | OUTPATIENT
Start: 2025-03-20 | End: 2025-03-26 | Stop reason: SDUPTHER

## 2025-03-20 RX ORDER — FENTANYL CITRATE 50 UG/ML
INJECTION, SOLUTION INTRAMUSCULAR; INTRAVENOUS
Status: DISCONTINUED | OUTPATIENT
Start: 2025-03-20 | End: 2025-03-20 | Stop reason: SDUPTHER

## 2025-03-20 RX ORDER — DOXYCYCLINE HYCLATE 100 MG
100 TABLET ORAL 2 TIMES DAILY
Qty: 14 TABLET | Refills: 0 | Status: SHIPPED | OUTPATIENT
Start: 2025-03-20 | End: 2025-03-27

## 2025-03-20 RX ORDER — LIDOCAINE HYDROCHLORIDE 10 MG/ML
1 INJECTION, SOLUTION EPIDURAL; INFILTRATION; INTRACAUDAL; PERINEURAL
Status: DISCONTINUED | OUTPATIENT
Start: 2025-03-20 | End: 2025-03-20 | Stop reason: HOSPADM

## 2025-03-20 RX ORDER — ROCURONIUM BROMIDE 10 MG/ML
INJECTION, SOLUTION INTRAVENOUS
Status: DISCONTINUED | OUTPATIENT
Start: 2025-03-20 | End: 2025-03-20 | Stop reason: SDUPTHER

## 2025-03-20 RX ORDER — TRANEXAMIC ACID 650 MG/1
1950 TABLET ORAL ONCE
Status: COMPLETED | OUTPATIENT
Start: 2025-03-20 | End: 2025-03-20

## 2025-03-20 RX ORDER — DIPHENHYDRAMINE HYDROCHLORIDE 50 MG/ML
12.5 INJECTION, SOLUTION INTRAMUSCULAR; INTRAVENOUS
Status: DISCONTINUED | OUTPATIENT
Start: 2025-03-20 | End: 2025-03-20 | Stop reason: HOSPADM

## 2025-03-20 RX ORDER — METOCLOPRAMIDE HYDROCHLORIDE 5 MG/ML
10 INJECTION INTRAMUSCULAR; INTRAVENOUS
Status: COMPLETED | OUTPATIENT
Start: 2025-03-20 | End: 2025-03-20

## 2025-03-20 RX ADMIN — ACETAMINOPHEN 1000 MG: 500 TABLET ORAL at 09:44

## 2025-03-20 RX ADMIN — CELECOXIB 200 MG: 200 CAPSULE ORAL at 09:45

## 2025-03-20 RX ADMIN — APREPITANT 40 MG: 40 CAPSULE ORAL at 10:06

## 2025-03-20 RX ADMIN — SUGAMMADEX 200 MG: 100 INJECTION, SOLUTION INTRAVENOUS at 12:51

## 2025-03-20 RX ADMIN — SODIUM CHLORIDE, SODIUM LACTATE, POTASSIUM CHLORIDE, AND CALCIUM CHLORIDE: 600; 310; 30; 20 INJECTION, SOLUTION INTRAVENOUS at 09:31

## 2025-03-20 RX ADMIN — LIDOCAINE HYDROCHLORIDE 50 MG: 10 INJECTION, SOLUTION INFILTRATION; PERINEURAL at 10:42

## 2025-03-20 RX ADMIN — CEFAZOLIN 2000 MG: 1 INJECTION, POWDER, FOR SOLUTION INTRAMUSCULAR; INTRAVENOUS at 10:50

## 2025-03-20 RX ADMIN — SODIUM CHLORIDE, SODIUM LACTATE, POTASSIUM CHLORIDE, AND CALCIUM CHLORIDE: 600; 310; 30; 20 INJECTION, SOLUTION INTRAVENOUS at 12:58

## 2025-03-20 RX ADMIN — ROCURONIUM BROMIDE 50 MG: 10 INJECTION, SOLUTION INTRAVENOUS at 10:42

## 2025-03-20 RX ADMIN — FAMOTIDINE 20 MG: 20 TABLET, FILM COATED ORAL at 10:06

## 2025-03-20 RX ADMIN — HYDROMORPHONE HYDROCHLORIDE 0.5 MG: 1 INJECTION, SOLUTION INTRAMUSCULAR; INTRAVENOUS; SUBCUTANEOUS at 13:07

## 2025-03-20 RX ADMIN — METOCLOPRAMIDE HYDROCHLORIDE 10 MG: 5 INJECTION INTRAMUSCULAR; INTRAVENOUS at 13:07

## 2025-03-20 RX ADMIN — FENTANYL CITRATE 50 MCG: 0.05 INJECTION, SOLUTION INTRAMUSCULAR; INTRAVENOUS at 10:42

## 2025-03-20 RX ADMIN — OXYCODONE HYDROCHLORIDE 10 MG: 10 TABLET, FILM COATED, EXTENDED RELEASE ORAL at 09:45

## 2025-03-20 RX ADMIN — PROPOFOL 130 MG: 10 INJECTION, EMULSION INTRAVENOUS at 10:42

## 2025-03-20 RX ADMIN — DEXAMETHASONE SODIUM PHOSPHATE 10 MG: 10 INJECTION, SOLUTION INTRAMUSCULAR; INTRAVENOUS at 10:52

## 2025-03-20 RX ADMIN — HYDROMORPHONE HYDROCHLORIDE 0.5 MG: 1 INJECTION, SOLUTION INTRAMUSCULAR; INTRAVENOUS; SUBCUTANEOUS at 13:20

## 2025-03-20 RX ADMIN — MIDAZOLAM HYDROCHLORIDE 2 MG: 1 INJECTION, SOLUTION INTRAMUSCULAR; INTRAVENOUS at 10:08

## 2025-03-20 RX ADMIN — TRANEXAMIC ACID 1950 MG: 650 TABLET ORAL at 09:45

## 2025-03-20 RX ADMIN — ROPIVACAINE HYDROCHLORIDE 20 ML: 5 INJECTION, SOLUTION EPIDURAL; INFILTRATION; PERINEURAL at 10:13

## 2025-03-20 RX ADMIN — FENTANYL CITRATE 50 MCG: 0.05 INJECTION, SOLUTION INTRAMUSCULAR; INTRAVENOUS at 11:09

## 2025-03-20 RX ADMIN — ONDANSETRON 4 MG: 2 INJECTION INTRAMUSCULAR; INTRAVENOUS at 12:07

## 2025-03-20 RX ADMIN — LIDOCAINE HYDROCHLORIDE 1 ML: 10 INJECTION, SOLUTION INFILTRATION; PERINEURAL at 10:13

## 2025-03-20 ASSESSMENT — PAIN DESCRIPTION - DESCRIPTORS
DESCRIPTORS: ACHING

## 2025-03-20 ASSESSMENT — PAIN SCALES - GENERAL
PAINLEVEL_OUTOF10: 8
PAINLEVEL_OUTOF10: 5
PAINLEVEL_OUTOF10: 8
PAINLEVEL_OUTOF10: 7

## 2025-03-20 ASSESSMENT — PAIN - FUNCTIONAL ASSESSMENT
PAIN_FUNCTIONAL_ASSESSMENT: 0-10
PAIN_FUNCTIONAL_ASSESSMENT: NONE - DENIES PAIN
PAIN_FUNCTIONAL_ASSESSMENT: 0-10

## 2025-03-20 ASSESSMENT — PAIN DESCRIPTION - LOCATION
LOCATION: KNEE
LOCATION: KNEE

## 2025-03-20 NOTE — DISCHARGE INSTRUCTIONS
[facility]  Dr. Yaakov Chaves      Total Knee and Uni Knee Replacement  Discharge Instructions     To prevent blood clots, you have been placed on the following medication:  Aspirin 81 mg twice a day for four weeks    Surgical Site Care:  Showering is permitted on post op day 2 - Saturday  No submersion in a bath, swimming pool, whirlpool, etc    Physical Therapy:  You were given a prescription for outpatient therapy.  Please schedule with [facility]  if convenient by calling [phone]. Otherwise, schedule with a therapist closer to your home  Work on range of motion.    Goal for your first office visit is for you to fully straighten and bend your knee to at least a right angle   Don't walk for exercise (it will make you more sore)  Weight Bearing Status:  Full weight bearing with a walker     Pain Medications  You were given a prescription to fill at your pharmacy  Wean off pain medications as you deem appropriate as long as pain is under control  Take tylenol instead of the prescribed pain medicine as your pain improves    Cold packs                                                                                                               FEVER .5 or less        May be used as necessary                                                                    Take Tylenol x 2                                                                                                            Deep breath x 10   Please take a stool softener such as dulcolax or colace daily                                 Cough, cough, cough                                                                                                                        Recheck in 1 - 11/2                                                                                                                                            hours  Do not drive until surgeon releases you  DO NOT SMOKE, VAPE OR CHEW!!!    Contact office  if  Increased redness, swelling, drainage of any kind, and/or severe pain at surgery site.  As well as new onset fevers and or chills.  These could signify an infection.  Calf tenderness to touch as well as increased swelling or redness.  This could signify a clot.  Any rash appears, increased  or new onset nausea/vomiting occur.  This may indicate a reaction to a medication.     Phone # 6  option 3.  Leave message for my assistant.    Please use My Chart for all pain medication requests  If you have an absolute emergency, text me with your name and problem at .  (Dr. Paredes)  Or contact Ortho Navigator at 1 [phone].  (Brisa)    Follow up with Surgeon at scheduled appointment time.  Thanks for the opportunity to care for you!

## 2025-03-20 NOTE — DISCHARGE INSTR - DIET
Good nutrition is important when healing from an illness, injury, or surgery.  Follow any nutrition recommendations given to you during your hospital stay.   If you were given an oral nutrition supplement while in the hospital, continue to take this supplement at home.  You can take it with meals, in-between meals, and/or before bedtime. These supplements can be purchased at most local grocery stores, pharmacies, and chain [employer]-stores.   If you have any questions about your diet or nutrition, call the hospital and ask for the dietitian.            Low carb / High protein

## 2025-03-20 NOTE — OP NOTE
TOTAL KNEE ARTHROPLASTY OPERATIVE NOTE    NAME OF SURGEON / : Yaakov Chaves MD  PATIENT:   Li León  Date: 3/20/2025        Time: 12:14 PM   Referring Physician: ________________________    PREOP DIAGNOSIS:  right Knee  Primary osteoarthritis   POSTOP DIAGNOSIS:  Same     PROCEDURE:    Right  Cemented Posterior Stabilized Knee arthroplasty, TYLER robot assisted (imageless)    IMPLANTS:   Implant Name Type Inv. Item Serial No.  Lot No. LRB No. Used Action   CEMENT BONE 40 GM W/ GENTMYCN HI VISC PALACOS R+G - [id number]  CEMENT BONE 40 GM W/ GENTMYCN HI VISC PALACOS R+G  [employer]- [id number] Right 1 Implanted   CEMENT BONE 40 GM W/ GENTMYCN HI VISC PALACOS R+G - [id number]  CEMENT BONE 40 GM W/ GENTMYCN HI VISC PALACOS R+G  [employer]- [id number] Right 1 Implanted   COMPONENT FEM SZ 8 R KNEE CO CHROM NANO POST STBL RAMO - [id number]  COMPONENT FEM SZ 8 R KNEE CO CHROM NANO POST STBL RAMO  [employer]- [id number] Right 1 Implanted   PSN TIB STM 5 DEG SZ D R - [id number]  PSN TIB STM 5 DEG SZ D R  [employer]- [id number] Right 1 Implanted   EXTENSION STEM L30MM DBP89IM KNEE TAPR RAMO PERSONA - [id number]  EXTENSION STEM L30MM TCJ43DE KNEE TAPR RAMO PERSONA  [employer]- [id number] Right 1 Implanted   COMPONENT PAT OPP94KV THK9MM KNEE POLY RAMO CONVENTIONAL - [id number]  COMPONENT PAT HUR36MA THK9MM KNEE POLY RAMO CONVENTIONAL  [employer]- [id number] Right 1 Implanted   IMPL KNEE ASF FB PSN CPS 12MM VE R6-9CD - [id number] Knee IMPL KNEE ASF FB PSN CPS 12MM VE R6-9CD  [employer] [id number] Right 1 Implanted       FINDINGS:  Preop ROM:  Full except for   -  full  extension  Alignment:    neutral  Bone quality:  soft  Cartilage wear:  Medial - severe  Lateral - mild  Pat-fem -severe  ACL -Intact    Deviation from robotic plan:  used traditional femoral sizing technique    ASSISTANT: Brenton Bauer, certified first assistant.   running 2-0 vicryl.  Skin was closed with 3-0 vicryl and Prineo. A sterile dressing was applied.     The patient was awakened, extubated and transferred to the recovery room in stable condition.            PLAN:  Full weight bearing, ROM, dvt prophylaxis        Electronically signed by MIRLANDE LORENZO MD on 3/20/2025 at 12:14 PM

## 2025-03-20 NOTE — PROGRESS NOTES
Patient discharged home today with outpatient therapy script.  Went over all discharge instructions and new medications with patient's friend and provided a copy of all new medications to take home.  Patient's friend verbalized understanding.  Patient's stability will be assessed by PT and Op Care RN before discharging home.  Electronically signed by Angela Layne RN on 3/20/2025 at 1:06 PM

## 2025-03-20 NOTE — PROGRESS NOTES
Physical Therapy  Physical Therapy     Facility/Department: [facility] OR  Initial Assessment  PHYSICAL THERAPY EVALUATION      Li León    : 1950  MRN: [id number]   PHYSICIAN:  John Paredes*  Primary Problem    Patient Active Problem List   Diagnosis    Esophagitis    HTN (hypertension)    Tobacco abuse    Obesity    DJD (degenerative joint disease)    IBS (irritable bowel syndrome)    Glucose intolerance (impaired glucose tolerance)    Intractable chronic migraine without aura and without status migrainosus    Photophobia    Meniere's disease    Vertigo    Right knee pain       Rehabilitation Diagnosis:     Right knee pain [M25.561]     R TKR  SERVICE DATE: 3/20/2025        SUBJECTIVE: Patient states that they are planning on discharging home today    Pain Screening  Patient Currently in Pain: No    PRIOR LEVEL OF FUNCTION:    [] Independent in community no assistive device     [x] Independent in community with assistive device     [] Independent in the house with assistive device     [] Transfer only    []     OBJECTIVE:  Orientation: Within functional Limits      ROM - Passive, Non-operative side     [] Left lower extremity  [x] Right lower extremity       Within functional limits    ROM - Passive, operative side    [x] Left lower extremity  [] Right lower extremity      Hip - extension to 0 degrees and flexion to 80 in sitting    Knee - extension to 0 degrees in supine and flexion to 80 in sitting        STRENGTH - Non-operative side    [] Left lower extremity  [x] Right lower extremity       Within functional limits    STRENGTH - operative side    [x] Left lower extremity  [] Right lower extremity    Grossly  3-/5        TRANSFERS   Sit to stand     [x] CGA [] Minimum        Bed to chair     [x] CGA [] Minimum    Bed mobility   Supine to sit      [x] CGA [] Minimum      Scoot  [] Side to side  [] Up and down     [] CGA [] Minimum    AMBULATION   Weight bearing: - WBAT      Distance: 20 ft x

## 2025-03-20 NOTE — ANESTHESIA POSTPROCEDURE EVALUATION
Department of Anesthesiology  Postprocedure Note    Patient: Li León  MRN: [id number]  YOB: 1950  Date of evaluation: 3/20/2025    Procedure Summary       Date: 03/20/25 Room / Location: [address]    Anesthesia Start: 1037 Anesthesia Stop: 1302    Procedure: ROBOTIC RIGHT KNEE TOTAL ARTHROPLASTY (Right: Knee) Diagnosis:       Right knee pain      (Right knee pain [M25.561])    Surgeons: John Paredes MD Responsible Provider: Angelica Tejada APRN - CRNA    Anesthesia Type: general, regional ASA Status: 4            Anesthesia Type: No value filed.    Alannah Phase I: Alannah Score: 8    Alannah Phase II:      Anesthesia Post Evaluation    Patient location during evaluation: PACU  Patient participation: complete - patient participated  Level of consciousness: sleepy but conscious  Pain score: 0  Airway patency: patent  Nausea & Vomiting: no nausea and no vomiting  Cardiovascular status: hemodynamically stable  Respiratory status: acceptable  Hydration status: stable  Pain management: adequate    No notable events documented.

## 2025-03-20 NOTE — H&P
[facility] Pre-Operative History and Physical    Patient Name: Genna  : 1950        Chief Complaint: Right knee pain  History of Present Illness:   This patient has had ongoing pain for several weeks/months that has been unresponsive to conservative care which has included injection, therapy, activity modification and presents now for surgery.    Past Medical History:       Diagnosis Date    Acid reflux     Bundle branch block, left     CHF (congestive heart failure) ([facility])     sees dr. rosas    Diabetes mellitus ([facility])     DJD (degenerative joint disease) 2010    Esophagitis 2010    HTN (hypertension) 2010    Hyperlipidemia     IBS (irritable bowel syndrome) 2010    Knee pain     Obesity 2010    PONV (postoperative nausea and vomiting)      Past Surgical History:       Procedure Laterality Date    APPENDECTOMY      CARDIAC CATHETERIZATION  2010    EF 60%. normal coronay arteries     CERVICAL SPINE SURGERY  2011    ANTERIOR C5-6 FUSION    CERVICAL SPINE SURGERY      CHOLECYSTECTOMY      COLONOSCOPY      FOOT SURGERY      Left    HYSTERECTOMY (CERVIX STATUS UNKNOWN)      KNEE ARTHROPLASTY Left 2017    KNEE UNICOMPARTMENTAL ARTHROPLASTY  performed by John Paredes MD at [facility] OR    TEMPOROMANDIBULAR JOINT SURGERY      UPPER GASTROINTESTINAL ENDOSCOPY  2010    DARYA biopsy.       Medications:   Prior to Admission medications    Medication Sig Start Date End Date Taking? Authorizing Provider   vitamin D (ERGOCALCIFEROL) 1.25 MG (50309 UT) CAPS capsule Take 1 capsule by mouth once a week    Yes Tatiana Agee MD   mupirocin (BACTROBAN) 2 % ointment Apply intranasally twice a day for 7 consecutive days and the morning of surgery. 25  Yes Edwige Lara PA   carvedilol (COREG) 6.25 MG tablet Take 1 tablet by mouth 2 times daily 24  Yes Tatiana Agee MD   FARXIGA 10 MG tablet Take 1 tablet by mouth daily   Yes Tatiana Agee MD  with no rashes or lesions  LYMPH: No cervical or inguinal lymphadenopathy    RADIOLOGY: xrays of rightknee show severe knee arthritis  LABORATORY:    CBC :    Lab Results   Component Value Date/Time    WBC 6.4 03/10/2025 01:25 PM    HGB 15.5 03/10/2025 01:25 PM    HCT 47.7 03/10/2025 01:25 PM     03/10/2025 01:25 PM     BMP:   Lab Results   Component Value Date/Time     03/10/2025 01:25 PM    K 4.5 03/10/2025 01:25 PM     03/10/2025 01:25 PM    CO2 28 03/10/2025 01:25 PM    BUN 14 03/10/2025 01:25 PM    CREATININE 0.7 03/10/2025 01:25 PM    CALCIUM 9.2 03/10/2025 01:25 PM    LABGLOM >90 03/10/2025 01:25 PM    GLUCOSE 87 03/10/2025 01:25 PM     PT/INR:    Lab Results   Component Value Date/Time    PROTIME 12.9 03/10/2025 01:25 PM    INR 1.00 03/10/2025 01:25 PM     U/A: No results found for: \"NITRITE\", \"WBCUA\", \"RBCUA\", \"BACTERIA\"  HgBA1c:  No components found for: \"HGBA1C\"    IMPRESSION:  Right primary knee osteoarthritis. Most recent office note reviewed and there has been no change in health status.       PLAN: Right knee replacement.  I explained to the patient/family the patient's diagnosis and operative procedure in detail. They said they understood basically what was wrong and how I planned to fix it.  They understand the expected recovery and the risks which include excessive bleeding, infection, reaction to anesthesia, nerve injury, stiffness, fracture, deformity and dislocation.  They then signed an operative consent form.      Provider: MIRLANDE LORENZO MD  Date: 3/20/2025

## 2025-03-20 NOTE — ANESTHESIA PRE PROCEDURE
POC Tests:   Recent Labs     03/20/25  0927   POCGLU 104*       Coags:   Lab Results   Component Value Date/Time    PROTIME 12.9 03/10/2025 01:25 PM    INR 1.00 03/10/2025 01:25 PM    APTT 31.7 03/10/2025 01:25 PM       HCG (If Applicable): No results found for: \"PREGTESTUR\", \"PREGSERUM\", \"HCG\", \"HCGQUANT\"     ABGs: No results found for: \"PHART\", \"PO2ART\", \"IMB7DUY\", \"FBB5PKG\", \"BEART\", \"E9JKZKEP\"     Type & Screen (If Applicable):  No results found for: \"LABABO\"    Anesthesia Evaluation  Patient summary reviewed and Nursing notes reviewed   history of anesthetic complications: PONV.  Airway: Mallampati: II          Dental: normal exam         Pulmonary:Negative Pulmonary ROS and normal exam                               Cardiovascular:    (+) hypertension:, CHF:, hyperlipidemia      ECG reviewed               Beta Blocker:  Dose within 24 Hrs      ROS comment: EF 40 %     Neuro/Psych:   Negative Neuro/Psych ROS  (+) depression/anxiety             GI/Hepatic/Renal:   (+) GERD:          Endo/Other:    (+) DiabetesType II DM, : arthritis:..                 Abdominal:   (+) obese          Vascular: negative vascular ROS.         Other Findings:             Anesthesia Plan      general and regional     ASA 4     (Adductor canal block)  Induction: intravenous.    MIPS: Postoperative opioids intended and Prophylactic antiemetics administered.  Anesthetic plan and risks discussed with patient.              Post-op pain plan if not by surgeon: single peripheral nerve block            Sydnee Huddleston MD   3/20/2025

## 2025-03-24 ENCOUNTER — TELEPHONE (OUTPATIENT)
Dept: INPATIENT UNIT | Age: 75
End: 2025-03-24

## 2025-03-25 ENCOUNTER — TELEPHONE (OUTPATIENT)
Age: 75
End: 2025-03-25

## 2025-03-25 DIAGNOSIS — M17.11 PRIMARY OSTEOARTHRITIS OF RIGHT KNEE: ICD-10-CM

## 2025-03-25 NOTE — TELEPHONE ENCOUNTER
Patient is requesting a refill on her percocet 5mg sent to [facility] pharmacy

## 2025-03-25 NOTE — TELEPHONE ENCOUNTER
Patient had right knee surgery on 3/20/25. Patient called stating that she need a refill of percocet 5's. Please advise.

## 2025-03-26 RX ORDER — OXYCODONE HYDROCHLORIDE 5 MG/1
5 TABLET ORAL EVERY 4 HOURS PRN
Qty: 40 TABLET | Refills: 0 | Status: SHIPPED | OUTPATIENT
Start: 2025-03-26 | End: 2025-04-05

## 2025-04-02 DIAGNOSIS — M17.11 PRIMARY OSTEOARTHRITIS OF RIGHT KNEE: ICD-10-CM

## 2025-04-03 ENCOUNTER — TELEPHONE (OUTPATIENT)
Age: 75
End: 2025-04-03

## 2025-04-03 DIAGNOSIS — M17.11 PRIMARY OSTEOARTHRITIS OF RIGHT KNEE: Primary | ICD-10-CM

## 2025-04-03 NOTE — TELEPHONE ENCOUNTER
Did call patient to let her know I sent her pain medication to Dr. Paredes and he will sign off on it whenever he's available, patient voiced understanding and agreed with care plan.     Also let patient if she needs another refill we will be dropping her down to norco 5's for one week and then tramadol, and if she needed anymore after that 5 week period she will need to contact her pcp or be referred to pain management

## 2025-04-04 RX ORDER — OXYCODONE HYDROCHLORIDE 5 MG/1
5 TABLET ORAL EVERY 4 HOURS PRN
Qty: 40 TABLET | Refills: 0 | Status: SHIPPED | OUTPATIENT
Start: 2025-04-04 | End: 2025-04-14

## 2025-04-07 RX ORDER — OXYCODONE HYDROCHLORIDE 5 MG/1
5 TABLET ORAL EVERY 4 HOURS PRN
Qty: 40 TABLET | Refills: 0 | OUTPATIENT
Start: 2025-04-07 | End: 2025-04-17

## 2025-04-10 DIAGNOSIS — M17.11 PRIMARY OSTEOARTHRITIS OF RIGHT KNEE: ICD-10-CM

## 2025-04-10 RX ORDER — OXYCODONE HYDROCHLORIDE 5 MG/1
5 TABLET ORAL EVERY 4 HOURS PRN
Qty: 40 TABLET | Refills: 0 | OUTPATIENT
Start: 2025-04-10 | End: 2025-04-20

## 2025-04-13 DIAGNOSIS — M17.11 PRIMARY OSTEOARTHRITIS OF RIGHT KNEE: ICD-10-CM

## 2025-04-16 ENCOUNTER — OFFICE VISIT (OUTPATIENT)
Age: 75
End: 2025-04-16

## 2025-04-16 VITALS — HEIGHT: 66 IN | WEIGHT: 219 LBS | BODY MASS INDEX: 35.2 KG/M2

## 2025-04-16 DIAGNOSIS — Z96.651 STATUS POST RIGHT KNEE REPLACEMENT: ICD-10-CM

## 2025-04-16 DIAGNOSIS — M25.561 RIGHT KNEE PAIN, UNSPECIFIED CHRONICITY: Primary | ICD-10-CM

## 2025-04-16 RX ORDER — TRAMADOL HYDROCHLORIDE 50 MG/1
50 TABLET ORAL NIGHTLY PRN
Qty: 30 TABLET | Refills: 0 | Status: SHIPPED | OUTPATIENT
Start: 2025-04-16 | End: 2025-05-16

## 2025-04-16 RX ORDER — OXYCODONE HYDROCHLORIDE 5 MG/1
5 TABLET ORAL EVERY 4 HOURS PRN
Qty: 40 TABLET | Refills: 0 | OUTPATIENT
Start: 2025-04-16 | End: 2025-04-26

## 2025-04-16 NOTE — PROGRESS NOTES
chlorhexidine gluconate (ANTISEPTIC SKIN CLEANSER) 4 % SOLN external solution Shower 3 consecutive showers. Night before the night before surgery. Night before surgery and the morning of surgery, 236 mL 0    naloxone 4 MG/0.1ML LIQD nasal spray 1 spray by Nasal route as needed for Opioid Reversal 1 - 3 sprays as needed 1 each 0    mupirocin (BACTROBAN) 2 % ointment Apply intranasally twice a day for 7 consecutive days and the morning of surgery. 30 g 0    carvedilol (COREG) 6.25 MG tablet Take 1 tablet by mouth 2 times daily      FARXIGA 10 MG tablet Take 1 tablet by mouth daily      sacubitril-valsartan (ENTRESTO) 24-26 MG per tablet Take 1 tablet by mouth 2 times daily      OZEMPIC, 2 MG/DOSE, 8 MG/3ML SOPN sc injection Inject 2 mg into the skin every 7 days sundays      traMADol (ULTRAM) 50 MG tablet Take 1 tablet by mouth every 6 hours as needed.      celecoxib (CELEBREX) 100 MG capsule Take 1 capsule by mouth 2 times daily      Ibandronate Sodium (BONIVA PO) Take 1 tablet by mouth every 30 days      furosemide (LASIX) 40 MG tablet Take 1 tablet by mouth daily      tiZANidine (ZANAFLEX) 4 MG tablet Take 1 tablet by mouth every 6 hours as needed      Multiple Vitamins-Minerals (MULTIVITAMIN ADULTS 50+) TABS Take 1 tablet by mouth daily Indications: SUPPLEMENT      amitriptyline (ELAVIL) 25 MG tablet Take 2 tablets by mouth nightly Indications: SLEEP      diazepam (VALIUM) 2 MG tablet Take 1 tablet by mouth every 6 hours as needed (MENIERES).      meclizine (ANTIVERT) 12.5 MG tablet Take 1 tablet by mouth 3 times daily as needed Indications: MENIERE'S      potassium chloride SA (K-DUR;KLOR-CON M) 20 MEQ tablet Take 1 tablet by mouth 2 times daily Indications: REPLACEMENT OF POTASSIUM      lovastatin (MEVACOR) 20 MG tablet Take 1 tablet by mouth every evening Indications: HYPERTENSION      SUMAtriptan (IMITREX) 100 MG tablet Take 1 tablet by mouth once as needed for Migraine (max 2 in 24 hrs) 9 tablet 11     No

## 2025-05-19 ENCOUNTER — OFFICE VISIT (OUTPATIENT)
Age: 75
End: 2025-05-19

## 2025-05-19 VITALS — HEIGHT: 66 IN | BODY MASS INDEX: 33.27 KG/M2 | WEIGHT: 207 LBS

## 2025-05-19 DIAGNOSIS — Z96.651 STATUS POST RIGHT KNEE REPLACEMENT: Primary | ICD-10-CM

## 2025-05-19 PROCEDURE — 99024 POSTOP FOLLOW-UP VISIT: CPT | Performed by: PHYSICIAN ASSISTANT

## 2025-05-19 ASSESSMENT — ENCOUNTER SYMPTOMS: SHORTNESS OF BREATH: 0
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
## 2025-06-25 ENCOUNTER — OFFICE VISIT (OUTPATIENT)
Age: 75
End: 2025-06-25
Payer: MEDICARE

## 2025-06-25 VITALS — HEIGHT: 66 IN | BODY MASS INDEX: 33.27 KG/M2 | WEIGHT: 207 LBS

## 2025-06-25 DIAGNOSIS — Z96.651 STATUS POST RIGHT KNEE REPLACEMENT: Primary | ICD-10-CM

## 2025-06-25 PROCEDURE — G8399 PT W/DXA RESULTS DOCUMENT: HCPCS | Performed by: ORTHOPAEDIC SURGERY

## 2025-06-25 PROCEDURE — 1090F PRES/ABSN URINE INCON ASSESS: CPT | Performed by: ORTHOPAEDIC SURGERY

## 2025-06-25 PROCEDURE — G8427 DOCREV CUR MEDS BY ELIG CLIN: HCPCS | Performed by: ORTHOPAEDIC SURGERY

## 2025-06-25 PROCEDURE — 1159F MED LIST DOCD IN RCRD: CPT | Performed by: ORTHOPAEDIC SURGERY

## 2025-06-25 PROCEDURE — 3017F COLORECTAL CA SCREEN DOC REV: CPT | Performed by: ORTHOPAEDIC SURGERY

## 2025-06-25 PROCEDURE — G8417 CALC BMI ABV UP PARAM F/U: HCPCS | Performed by: ORTHOPAEDIC SURGERY

## 2025-06-25 PROCEDURE — 1036F TOBACCO NON-USER: CPT | Performed by: ORTHOPAEDIC SURGERY

## 2025-06-25 PROCEDURE — 1123F ACP DISCUSS/DSCN MKR DOCD: CPT | Performed by: ORTHOPAEDIC SURGERY

## 2025-06-25 PROCEDURE — 99213 OFFICE O/P EST LOW 20 MIN: CPT | Performed by: ORTHOPAEDIC SURGERY

## 2025-06-25 NOTE — PROGRESS NOTES
[facility]  [facility]  [address]  [facility] [id number] [phone]         Li León (: 1950) is a 74 y.o. female, patient, here for evaluation of the following chief complaint(s): Follow-up (Right knee/SX: )  .         Patient's PCP: Kyara Brown MD     Patient's Last Appointment in this Department was on 2025      Subjective:     Chief Complaint   Patient presents with    Follow-up     Right knee  SX:         History of Present Illness  The patient presents for a 3-month follow-up status post right knee total arthroplasty    She reports a satisfactory recovery from her right knee surgery, with no complications or discomfort. She has been engaging in aquatic exercises, specifically walking in the pool, as part of her rehabilitation process. She is not currently on any medication regimen. Her left knee remains unaffected and does not cause her any distress.  We did a Uni knee on the left and the recovery is a bit easier than the right full knee arthroplasty.    SOCIAL HISTORY  Exercise: Walks in the pool              Medications  Current Outpatient Medications   Medication Sig Dispense Refill    aspirin (ASPIRIN CHILDRENS) 81 MG chewable tablet Take 1 tablet by mouth in the morning and at bedtime 60 tablet 0    vitamin D (ERGOCALCIFEROL) 1.25 MG (66475 UT) CAPS capsule Take 1 capsule by mouth once a week       chlorhexidine gluconate (ANTISEPTIC SKIN CLEANSER) 4 % SOLN external solution Shower 3 consecutive showers. Night before the night before surgery. Night before surgery and the morning of surgery, 236 mL 0    naloxone 4 MG/0.1ML LIQD nasal spray 1 spray by Nasal route as needed for Opioid Reversal 1 - 3 sprays as needed 1 each 0    mupirocin (BACTROBAN) 2 % ointment Apply intranasally twice a day for 7 consecutive days and the morning of surgery. 30 g 0    carvedilol (COREG)

## (undated) DEVICE — SUTURE VCRL SZ 2-0 L36IN ABSRB UD L36MM CT-1 1/2 CIR J945H

## (undated) DEVICE — C-ARM: Brand: UNBRANDED

## (undated) DEVICE — CURAVIEW LED LARYNGOSCOPE BLADE & HANDLE,DISPOSABLE,MAC 3.5: Brand: CURAPLEX

## (undated) DEVICE — Device: Brand: STABLECUT® OXFORD™

## (undated) DEVICE — GLOVE SURG SZ 85 CRM LTX FREE POLYISOPRENE POLYMER BEAD ANTI

## (undated) DEVICE — INSTRUMENT KIT ORTHOPEDIC KNEE NAVITRACK

## (undated) DEVICE — LARGE BONE HALL BLADE, RECIPROCATOR, 12.5 X 76 X 1.27 MM: Brand: HALL

## (undated) DEVICE — OPTIFOAM GENTLE SA, POSTOP, 4X12: Brand: MEDLINE

## (undated) DEVICE — SUTURE VICRYL + SZ 2-0 L36IN ABSRB UD L36MM CT-1 1/2 CIR VCP945H

## (undated) DEVICE — CHLORAPREP 26ML ORANGE

## (undated) DEVICE — GOWN, ORBIS, XLARGE, STERILE: Brand: MEDLINE

## (undated) DEVICE — DRESSING FOAM W4XL12IN SIL RECT ADH WTRPRF FLM BK W/ BORD

## (undated) DEVICE — GAUZE,SPONGE,FLUFF,6"X6.75",STRL,10/TRAY: Brand: MEDLINE

## (undated) DEVICE — NEPTUNE E-SEP SMOKE EVACUATION PENCIL, COATED, 70MM BLADE, ROCKER SWITCH: Brand: NEPTUNE E-SEP

## (undated) DEVICE — TRAY EPI 25GA L3.5IN 0.75% BIPIVCAIN 8.25% D CONTAIN BPA

## (undated) DEVICE — Device

## (undated) DEVICE — SOLUTION IRRIG 1000ML 09% SOD CHL USP PIC PLAS CONTAINER

## (undated) DEVICE — SOLUTION IRRIG 3000ML 0.9% SOD CHL USP UROMATIC PLAS CONT

## (undated) DEVICE — RECIPROCATING BLADE DOUBLE SIDE (74.0 X 0.77MM)

## (undated) DEVICE — CEMENT MIXING SYSTEM WITH FEMORAL BREAKWAY NOZZLE: Brand: REVOLUTION

## (undated) DEVICE — UNDERGLOVE SURG SZ 8 FNGR THK0.21MIL GRN LTX BEAD CUF

## (undated) DEVICE — SOLUTION IV IRRIG POUR BRL 0.9% SODIUM CHL 2F7124

## (undated) DEVICE — BANDAGE COMPR W6INXL15FT BGE E SGL LAYERED CLP CLSR

## (undated) DEVICE — ZIMMER® STERILE DISPOSABLE TOURNIQUET CUFF WITH PLC, DUAL PORT, SINGLE BLADDER, 34 IN. (86 CM)

## (undated) DEVICE — SOLUTION IV IRRIG WATER 1000ML POUR BRL 2F7114

## (undated) DEVICE — DUAL CUT SAGITTAL BLADE

## (undated) DEVICE — BANDAGE COMPR W6XL80IN COT E 1 LAYR CLP CLSR PREM GRD CONTEX

## (undated) DEVICE — SUTURE ABSORBABLE MONOFILAMENT 1-0 OS8 14 IN STRATAFIX SPRL SXPD2B202

## (undated) DEVICE — TOWEL,OR,DSP,ST,BLUE,DLX,4/PK,20PK/CS: Brand: MEDLINE

## (undated) DEVICE — SUTURE ABSRB BRAID COAT UD CP NO 2 27IN VCRL J195H

## (undated) DEVICE — DRAPE ROSA RBTC UNT 20 DROP

## (undated) DEVICE — PIN FIX STERILE L150MM DIA3.2MM FLUT

## (undated) DEVICE — THREE QUARTER SHEET: Brand: CONVERTORS

## (undated) DEVICE — GAUZE,SPONGE,8"X4",12PLY,XRAY,STRL,LF: Brand: MEDLINE

## (undated) DEVICE — GLOVE SURG SZ 85 L12IN FNGR ORTHO 126MIL CRM LTX FREE

## (undated) DEVICE — HANDPIECE SET WITH COAXIAL MULTI-ORIFICE TIP AND SUCTION TUBE: Brand: INTERPULSE

## (undated) DEVICE — SURGICAL PROCEDURE PACK KNEE TOT DBD CDS LOURDES HOSP LF

## (undated) DEVICE — SHEET,DRAPE,53X77,STERILE: Brand: MEDLINE

## (undated) DEVICE — OSCILLATOR BLADE, 25.4 X 90 X 1.27 MM (.050"): Brand: CONMED

## (undated) DEVICE — STERILE LATEX POWDER FREE SURGICAL GLOVES WITH HYDROGEL COATING: Brand: PROTEXIS

## (undated) DEVICE — ADHESIVE SKIN CLOSURE WND 8.661X1.5 IN 22 CM LIQUIBAND SECUR

## (undated) DEVICE — TUBE ET 7MM NSL ORAL BASIC CUF INTMED MURPHY EYE RADPQ MRK

## (undated) DEVICE — PIN FIX STERILE L80MM DIA3.2MM FLUT CAS

## (undated) DEVICE — ABDOMINAL PAD: Brand: DERMACEA

## (undated) DEVICE — Device: Brand: POWER-FLO®

## (undated) DEVICE — GLOVE SURG SZ 85 L12IN FNGR THK79MIL GRN LTX FREE